# Patient Record
Sex: MALE | Race: WHITE | Employment: FULL TIME | URBAN - NONMETROPOLITAN AREA
[De-identification: names, ages, dates, MRNs, and addresses within clinical notes are randomized per-mention and may not be internally consistent; named-entity substitution may affect disease eponyms.]

---

## 2020-12-28 ENCOUNTER — APPOINTMENT (OUTPATIENT)
Dept: GENERAL RADIOLOGY | Age: 55
End: 2020-12-28
Attending: EMERGENCY MEDICINE
Payer: COMMERCIAL

## 2020-12-28 ENCOUNTER — HOSPITAL ENCOUNTER (EMERGENCY)
Age: 55
Discharge: HOME | End: 2020-12-28
Attending: EMERGENCY MEDICINE
Payer: COMMERCIAL

## 2020-12-28 VITALS
HEART RATE: 93 BPM | BODY MASS INDEX: 33.75 KG/M2 | SYSTOLIC BLOOD PRESSURE: 127 MMHG | OXYGEN SATURATION: 94 % | HEIGHT: 66 IN | TEMPERATURE: 98.2 F | RESPIRATION RATE: 20 BRPM | WEIGHT: 210 LBS | DIASTOLIC BLOOD PRESSURE: 88 MMHG

## 2020-12-28 DIAGNOSIS — R07.89 ATYPICAL CHEST PAIN: Primary | ICD-10-CM

## 2020-12-28 LAB
ALBUMIN SERPL-MCNC: 4.1 G/DL (ref 3.5–5)
ALBUMIN/GLOB SERPL: 1.1 {RATIO} (ref 1.1–2.2)
ALP SERPL-CCNC: 107 U/L (ref 45–117)
ALT SERPL-CCNC: 54 U/L (ref 12–78)
ANION GAP SERPL CALC-SCNC: 13 MMOL/L (ref 5–15)
AST SERPL W P-5'-P-CCNC: 34 U/L (ref 15–37)
BASOPHILS # BLD: 0 K/UL (ref 0–0.2)
BASOPHILS NFR BLD: 0 % (ref 0–2.5)
BILIRUB SERPL-MCNC: 1.3 MG/DL (ref 0.2–1)
BUN SERPL-MCNC: 31 MG/DL (ref 6–20)
BUN/CREAT SERPL: 21 (ref 12–20)
CA-I BLD-MCNC: 8.5 MG/DL (ref 8.5–10.1)
CHLORIDE SERPL-SCNC: 98 MMOL/L (ref 97–108)
CO2 SERPL-SCNC: 26 MMOL/L (ref 21–32)
CREAT SERPL-MCNC: 1.47 MG/DL (ref 0.7–1.3)
EOSINOPHIL # BLD: 0.2 K/UL (ref 0–0.7)
EOSINOPHIL NFR BLD: 2 % (ref 0.9–2.9)
ERYTHROCYTE [DISTWIDTH] IN BLOOD BY AUTOMATED COUNT: 13.5 % (ref 11.5–14.5)
GLOBULIN SER CALC-MCNC: 3.6 G/DL (ref 2–4)
GLUCOSE SERPL-MCNC: 186 MG/DL (ref 65–100)
HCT VFR BLD AUTO: 40 % (ref 41–53)
HGB BLD-MCNC: 13.5 G/DL (ref 13.5–17.5)
LYMPHOCYTES # BLD: 2.4 K/UL (ref 1–4.8)
LYMPHOCYTES NFR BLD: 25 % (ref 20.5–51.1)
MCH RBC QN AUTO: 29.1 PG (ref 31–34)
MCHC RBC AUTO-ENTMCNC: 33.8 G/DL (ref 31–36)
MCV RBC AUTO: 86.2 FL (ref 80–100)
MONOCYTES # BLD: 0.7 K/UL (ref 0.2–2.4)
MONOCYTES NFR BLD: 7 % (ref 1.7–9.3)
NEUTS SEG # BLD: 6.4 K/UL (ref 1.8–7.7)
NEUTS SEG NFR BLD: 66 % (ref 42–75)
NRBC # BLD: 0.01 K/UL
NRBC BLD-RTO: 10 PER 100 WBC
PLATELET # BLD AUTO: 209 K/UL
PMV BLD AUTO: 8.3 FL (ref 6.5–11.5)
POTASSIUM SERPL-SCNC: 3.4 MMOL/L (ref 3.5–5.1)
PROT SERPL-MCNC: 7.7 G/DL (ref 6.4–8.2)
RBC # BLD AUTO: 4.64 M/UL (ref 4.5–5.9)
SODIUM SERPL-SCNC: 137 MMOL/L (ref 136–145)
TROPONIN I SERPL-MCNC: <0.05 NG/ML
TROPONIN I SERPL-MCNC: <0.05 NG/ML
WBC # BLD AUTO: 9.6 K/UL (ref 4.4–11.3)

## 2020-12-28 PROCEDURE — 80053 COMPREHEN METABOLIC PANEL: CPT

## 2020-12-28 PROCEDURE — 71045 X-RAY EXAM CHEST 1 VIEW: CPT

## 2020-12-28 PROCEDURE — 99285 EMERGENCY DEPT VISIT HI MDM: CPT

## 2020-12-28 PROCEDURE — 36415 COLL VENOUS BLD VENIPUNCTURE: CPT

## 2020-12-28 PROCEDURE — 74011250637 HC RX REV CODE- 250/637: Performed by: EMERGENCY MEDICINE

## 2020-12-28 PROCEDURE — 85025 COMPLETE CBC W/AUTO DIFF WBC: CPT

## 2020-12-28 PROCEDURE — 93005 ELECTROCARDIOGRAM TRACING: CPT

## 2020-12-28 PROCEDURE — 84484 ASSAY OF TROPONIN QUANT: CPT

## 2020-12-28 RX ORDER — METFORMIN HYDROCHLORIDE EXTENDED-RELEASE TABLETS 1000 MG/1
1000 TABLET, FILM COATED, EXTENDED RELEASE ORAL 2 TIMES DAILY
COMMUNITY

## 2020-12-28 RX ORDER — GUAIFENESIN 100 MG/5ML
162 LIQUID (ML) ORAL
Status: COMPLETED | OUTPATIENT
Start: 2020-12-28 | End: 2020-12-28

## 2020-12-28 RX ADMIN — ASPIRIN 162 MG: 81 TABLET, CHEWABLE ORAL at 18:32

## 2020-12-28 NOTE — ED TRIAGE NOTES
Pt reports on and off mid sternal chest pain x 1 week. C/O nausea that comes and goes and is not at the same time of the chest pain. Denies any complaints at this time. Skin is warm and dry. Denies shortness of breath.

## 2020-12-29 LAB
ATRIAL RATE: 107 BPM
CALCULATED P AXIS, ECG09: 28 DEGREES
CALCULATED R AXIS, ECG10: 12 DEGREES
CALCULATED T AXIS, ECG11: 12 DEGREES
DIAGNOSIS, 93000: NORMAL
P-R INTERVAL, ECG05: 145 MS
Q-T INTERVAL, ECG07: 341 MS
QRS DURATION, ECG06: 99 MS
QTC CALCULATION (BEZET), ECG08: 455 MS
VENTRICULAR RATE, ECG03: 107 BPM

## 2020-12-29 NOTE — ED PROVIDER NOTES
EMERGENCY DEPARTMENT HISTORY AND PHYSICAL EXAM      Date: 12/28/2020  Patient Name: Carrie Mckinney      History of Presenting Illness     Chief Complaint   Patient presents with    Chest Pain       History Provided By: Patient    HPI: Carrie Mckinney, 50 y.o. male with a past medical history significant hypertension, hyperlipidemia and Elevated cholesterol presents to the ED with cc of chest pain. Patient notes episodes of chest pain occurring 3-4 times daily lasting upwards of 30 minutes daily over the last week. No prior history of cardiac disease MI or angina. He denies dyspnea lower extremity edema or pain there is no history of thromboembolic disease. No family history of MI non-smoker. Evaluated in the PCPs office and referred to the ED for possible admission. There are no other complaints, changes, or physical findings at this time. PCP: Joe Neff MD    Current Outpatient Medications   Medication Sig Dispense Refill    metFORMIN ER 1,000 mg tr24 Take 1,000 mg by mouth two (2) times a day. Past History     Past Medical History:  Past Medical History:   Diagnosis Date    Arthritis     Diabetes ([address]     High cholesterol     Hypertension        Past Surgical History:  History reviewed. No pertinent surgical history. Family History:  History reviewed. No pertinent family history. Social History:  Social History     Tobacco Use    Smoking status: Never Smoker    Smokeless tobacco: Never Used   Substance Use Topics    Alcohol use: Never     Frequency: Never    Drug use: Never       Allergies:  No Known Allergies      Review of Systems   Review of all other systems is negative  Review of Systems    Physical Exam   Pleasant white male no acute distress currently without complaints  Physical Exam  Vitals signs and nursing note reviewed. Constitutional:       General: He is not in acute distress. Appearance: Normal appearance. He is well-developed.  He is not ill-appearing or toxic-appearing. HENT:      Head: Normocephalic and atraumatic. Nose: Nose normal.      Mouth/Throat:      Mouth: Mucous membranes are moist.   Eyes:      Extraocular Movements: Extraocular movements intact. Conjunctiva/sclera: Conjunctivae normal.      Pupils: Pupils are equal, round, and reactive to light. Neck:      Musculoskeletal: Normal range of motion and neck supple. No neck rigidity or muscular tenderness. Vascular: No carotid bruit. Cardiovascular:      Rate and Rhythm: Normal rate and regular rhythm. Pulses: Normal pulses. Carotid pulses are 2+ on the right side and 2+ on the left side. Radial pulses are 2+ on the right side and 2+ on the left side. Dorsalis pedis pulses are 2+ on the right side and 2+ on the left side. Posterior tibial pulses are 2+ on the right side and 2+ on the left side. Heart sounds: Normal heart sounds. No murmur. Pulmonary:      Effort: Pulmonary effort is normal. No respiratory distress. Breath sounds: Normal breath sounds. No wheezing, rhonchi or rales. Abdominal:      General: Abdomen is flat. There is no distension. Palpations: Abdomen is soft. There is no mass. Tenderness: There is no abdominal tenderness. There is no right CVA tenderness, left CVA tenderness, guarding or rebound. Hernia: No hernia is present. Musculoskeletal: Normal range of motion. Skin:     General: Skin is warm and dry. Neurological:      General: No focal deficit present. Mental Status: He is alert and oriented to person, place, and time. Mental status is at baseline. Psychiatric:         Mood and Affect: Mood normal.         Behavior: Behavior normal.         Thought Content: Thought content normal.         Lab and Diagnostic Study Results     Labs -   No results found for this or any previous visit (from the past 12 hour(s)).     Radiologic Studies -   [unfilled]  CT Results  (Last 48 hours)    None CXR Results  (Last 48 hours)               12/28/20 1800  XR CHEST PORT Final result    Impression:  Impression:       No acute cardiopulmonary process. Narrative:      Findings:       A single portable AP view of the chest was obtained 17:58 hours without   comparison available at this institution. Cardiac caliber appears upper normal. Lungs adequately ventilated, with no   edema, effusion or consolidation. Medical Decision Making and ED Course   - I am the first and primary provider for this patient AND AM THE PRIMARY PROVIDER OF RECORD. - I reviewed the vital signs, available nursing notes, past medical history, past surgical history, family history and social history. - Initial assessment performed. The patients presenting problems have been discussed, and the staff are in agreement with the care plan formulated and outlined with them. I have encouraged them to ask questions as they arise throughout their visit. Vital Signs-Reviewed the patient's vital signs. Patient Vitals for the past 12 hrs:   Temp Pulse Resp BP SpO2   12/28/20 1730 98.2 °F (36.8 °C) (!) 105 16 (!) 148/93 97 %       EKG interpretation: (Preliminary): Performed at 1724, and read at 1735  Rhythm: normal sinus rhythm; and regular . Rate (approx.): 107  ; Axis: normal; NJ interval: prolonged; QRS interval: prolonged; ST/T wave: normal; Other findings: Sinus tachycardia without acute changes. Records Reviewed: Nursing Notes and Old Medical Records    The patient presents with chest pain with a differential diagnosis of  ACS, arrhythmia, acute MI, pulmonary edema/CHF, angina, aortic dissection, chest wall pain, costochondritis, dyspnea and pnuemonia    ED Course: 53-year-old male with multiple risk factors gives a good history for new onset angina occurring several times a day upwards of 30 minutes. No clear precipitation by exercise or stress.   Plan for overnight observation stress test in the morning awaiting labs. Provider Notes (Medical Decision Making):   New onset angina troponin is negative and EKG shows no acute changes strongly encouraged the patient to be admitted for observation cardiology evaluation in the morning he desires discharge will have the oncoming physician check a second troponin and order an outpatient stress test.  At change of shift patient left in the care of Dr. Scott Meraz. MDM           Consultations:       Consultations:         Procedures and Critical Care       Performed by: Clayborn Cockayne, MD  PROCEDURES: None  Procedures                     Disposition     Disposition: Condition unchanged and stable        Remove if not discharged  DISCHARGE PLAN:  1. Current Discharge Medication List      CONTINUE these medications which have NOT CHANGED    Details   metFORMIN ER 1,000 mg tr24 Take 1,000 mg by mouth two (2) times a day. 2.   Follow-up Information    None       3. Return to ED if worse   4. Current Discharge Medication List          Diagnosis     Clinical Impression: Chest pain suspect new onset angina  Attestations:    Clayborn Cockayne, MD    Please note that this dictation was completed with [employer], the computer voice recognition software. Quite often unanticipated grammatical, syntax, homophones, and other interpretive errors are inadvertently transcribed by the computer software. Please disregard these errors. Please excuse any errors that have escaped final proofreading. Thank you.

## 2021-11-12 ENCOUNTER — TELEPHONE (OUTPATIENT)
Dept: GASTROENTEROLOGY | Age: 56
End: 2021-11-12

## 2021-11-12 DIAGNOSIS — Z12.11 ENCOUNTER FOR SCREENING FOR MALIGNANT NEOPLASM OF COLON: Primary | ICD-10-CM

## 2021-11-14 RX ORDER — POLYETHYLENE GLYCOL 3350 17 G/17G
POWDER, FOR SOLUTION ORAL
Qty: 510 G | Refills: 0 | Status: SHIPPED | OUTPATIENT
Start: 2021-11-14 | End: 2021-12-10

## 2021-12-06 ENCOUNTER — HOSPITAL ENCOUNTER (OUTPATIENT)
Dept: PREADMISSION TESTING | Age: 56
Discharge: HOME | End: 2021-12-06
Payer: COMMERCIAL

## 2021-12-06 LAB — SARS-COV-2, COV2: NORMAL

## 2021-12-06 PROCEDURE — U0005 INFEC AGEN DETEC AMPLI PROBE: HCPCS

## 2021-12-07 LAB
SARS-COV-2, XPLCVT: NOT DETECTED
SOURCE, COVRS: NORMAL

## 2021-12-10 ENCOUNTER — HOSPITAL ENCOUNTER (OUTPATIENT)
Age: 56
Setting detail: OUTPATIENT SURGERY
Discharge: HOME | End: 2021-12-10
Attending: INTERNAL MEDICINE | Admitting: INTERNAL MEDICINE
Payer: COMMERCIAL

## 2021-12-10 ENCOUNTER — ANESTHESIA EVENT (OUTPATIENT)
Dept: ENDOSCOPY | Age: 56
End: 2021-12-10
Payer: COMMERCIAL

## 2021-12-10 ENCOUNTER — ANESTHESIA (OUTPATIENT)
Dept: ENDOSCOPY | Age: 56
End: 2021-12-10
Payer: COMMERCIAL

## 2021-12-10 VITALS
TEMPERATURE: 97 F | BODY MASS INDEX: 31.39 KG/M2 | WEIGHT: 200 LBS | DIASTOLIC BLOOD PRESSURE: 74 MMHG | SYSTOLIC BLOOD PRESSURE: 117 MMHG | HEIGHT: 67 IN | HEART RATE: 84 BPM | RESPIRATION RATE: 18 BRPM | OXYGEN SATURATION: 94 %

## 2021-12-10 LAB
GLUCOSE BLD STRIP.AUTO-MCNC: 150 MG/DL (ref 65–117)
PERFORMED BY, TECHID: ABNORMAL

## 2021-12-10 PROCEDURE — 82962 GLUCOSE BLOOD TEST: CPT

## 2021-12-10 PROCEDURE — 2709999900 HC NON-CHARGEABLE SUPPLY: Performed by: INTERNAL MEDICINE

## 2021-12-10 PROCEDURE — 45378 DIAGNOSTIC COLONOSCOPY: CPT | Performed by: INTERNAL MEDICINE

## 2021-12-10 PROCEDURE — 74011000258 HC RX REV CODE- 258: Performed by: NURSE ANESTHETIST, CERTIFIED REGISTERED

## 2021-12-10 PROCEDURE — 74011250636 HC RX REV CODE- 250/636: Performed by: NURSE ANESTHETIST, CERTIFIED REGISTERED

## 2021-12-10 PROCEDURE — 74011250636 HC RX REV CODE- 250/636

## 2021-12-10 PROCEDURE — 76040000019: Performed by: INTERNAL MEDICINE

## 2021-12-10 PROCEDURE — 76060000031 HC ANESTHESIA FIRST 0.5 HR: Performed by: INTERNAL MEDICINE

## 2021-12-10 PROCEDURE — 74011250636 HC RX REV CODE- 250/636: Performed by: INTERNAL MEDICINE

## 2021-12-10 RX ORDER — PROPOFOL 10 MG/ML
INJECTION, EMULSION INTRAVENOUS AS NEEDED
Status: DISCONTINUED | OUTPATIENT
Start: 2021-12-10 | End: 2021-12-10 | Stop reason: HOSPADM

## 2021-12-10 RX ORDER — SODIUM CHLORIDE 0.9 % (FLUSH) 0.9 %
5-40 SYRINGE (ML) INJECTION AS NEEDED
Status: DISCONTINUED | OUTPATIENT
Start: 2021-12-10 | End: 2021-12-10 | Stop reason: HOSPADM

## 2021-12-10 RX ORDER — ONDANSETRON 2 MG/ML
INJECTION INTRAMUSCULAR; INTRAVENOUS AS NEEDED
Status: DISCONTINUED | OUTPATIENT
Start: 2021-12-10 | End: 2021-12-10 | Stop reason: HOSPADM

## 2021-12-10 RX ORDER — SODIUM CHLORIDE 9 MG/ML
INJECTION, SOLUTION INTRAVENOUS
Status: DISCONTINUED | OUTPATIENT
Start: 2021-12-10 | End: 2021-12-10 | Stop reason: HOSPADM

## 2021-12-10 RX ORDER — SODIUM CHLORIDE 0.9 % (FLUSH) 0.9 %
5-40 SYRINGE (ML) INJECTION EVERY 8 HOURS
Status: DISCONTINUED | OUTPATIENT
Start: 2021-12-10 | End: 2021-12-10 | Stop reason: HOSPADM

## 2021-12-10 RX ORDER — SODIUM CHLORIDE 9 MG/ML
25 INJECTION, SOLUTION INTRAVENOUS CONTINUOUS
Status: DISCONTINUED | OUTPATIENT
Start: 2021-12-10 | End: 2021-12-10 | Stop reason: HOSPADM

## 2021-12-10 RX ORDER — SODIUM CHLORIDE 9 MG/ML
125 INJECTION, SOLUTION INTRAVENOUS CONTINUOUS
Status: DISCONTINUED | OUTPATIENT
Start: 2021-12-10 | End: 2021-12-10 | Stop reason: HOSPADM

## 2021-12-10 RX ADMIN — PROPOFOL 100 MG: 10 INJECTION, EMULSION INTRAVENOUS at 11:00

## 2021-12-10 RX ADMIN — SODIUM CHLORIDE: 9 INJECTION INTRAVENOUS at 10:54

## 2021-12-10 RX ADMIN — ONDANSETRON 4 MG: 2 INJECTION INTRAMUSCULAR; INTRAVENOUS at 10:54

## 2021-12-10 RX ADMIN — SODIUM CHLORIDE 25 ML/HR: 9 INJECTION, SOLUTION INTRAVENOUS at 09:30

## 2021-12-10 RX ADMIN — PROPOFOL 50 MG: 10 INJECTION, EMULSION INTRAVENOUS at 11:09

## 2021-12-10 RX ADMIN — PROPOFOL 100 MG: 10 INJECTION, EMULSION INTRAVENOUS at 11:03

## 2021-12-10 NOTE — DISCHARGE INSTRUCTIONS

## 2021-12-10 NOTE — H&P
History and Physical    [facility]        1965  [id number]        [id number]     Pre-Procedure Diagnosis:  COLON SCREENING    Chief Complaint:  No chief complaint on file. HPI: 53-year-old male with history of hypertension, hyperlipidemia, diabetes mellitus type 2, and arthritis who comes in for a screening colonoscopy. Patient has no new complaints today. Past Medical History:   Diagnosis Date    Arthritis     Diabetes ([address]     High cholesterol     Hypertension     Nausea & vomiting      History reviewed. No pertinent surgical history. Family History   Problem Relation Age of Onset    No Known Problems Mother     Hypertension Father     Kidney Disease Father      Social History     Socioeconomic History    Marital status:    Tobacco Use    Smoking status: Never Smoker    Smokeless tobacco: Never Used   Vaping Use    Vaping Use: Never used   Substance and Sexual Activity    Alcohol use: Never    Drug use: Never       Allergies:  No Known Allergies  Medications:   Current Facility-Administered Medications   Medication Dose Route Frequency    0.9% sodium chloride infusion  25 mL/hr IntraVENous CONTINUOUS     Vital Signs   Visit Vitals  /84   Pulse 92   Temp 98.3 °F (36.8 °C)   Resp 18   Ht 5' 7\" (1.702 m)   Wt 90.7 kg (200 lb)   SpO2 94%   BMI 31.32 kg/m²       Review of Systems  Review of systems as noted in HPI. Physical Exam:  General:  Alert, cooperative, no distress, appears stated age. Eyes:  No icterus   Neck: Supple, no adenopathy and no JVD. Lungs:   Clear to auscultation bilaterally. Heart:  Regular rate and rhythm, S1, S2 normal, no murmur, click, rub or gallop. Abdomen:   Soft, non-tender. Bowel sounds normal. No masses,  No organomegaly. Neurologic: Grossly intact. Laboratory Data:  No results found for this or any previous visit (from the past 24 hour(s)).         Impression and Plan:  Colon screening  -Colonoscopy      [place] Funes MD  12/10/2021  9:28 AM

## 2021-12-10 NOTE — ANESTHESIA PREPROCEDURE EVALUATION
Relevant Problems   No relevant active problems       Anesthetic History   No history of anesthetic complications  PONV and other anesthesia complications          Review of Systems / Medical History  Patient summary reviewed, nursing notes reviewed and pertinent labs reviewed    Pulmonary  Within defined limits                 Neuro/Psych   Within defined limits           Cardiovascular  Within defined limits  Hypertension                   GI/Hepatic/Renal  Within defined limits              Endo/Other  Within defined limits  Diabetes    Arthritis     Other Findings              Physical Exam    Airway  Mallampati: II  TM Distance: 4 - 6 cm  Neck ROM: normal range of motion        Cardiovascular    Rhythm: regular  Rate: normal         Dental  No notable dental hx       Pulmonary  Breath sounds clear to auscultation               Abdominal  Abdominal exam normal       Other Findings            Anesthetic Plan    ASA: 2  Anesthesia type: total IV anesthesia            Anesthetic plan and risks discussed with: Patient

## 2021-12-10 NOTE — INTERVAL H&P NOTE
Update History & Physical    The Patient's History and Physical of December 10, 2021, was reviewed with the patient and I examined the patient. There was no change. The surgical site was confirmed by the patient and me. Plan:  The risk, benefits, expected outcome, and alternative to the recommended procedure have been discussed with the patient. Patient understands and wants to proceed with the procedure.     Electronically signed by Corine Duron MD on 12/10/2021 at 9:30 AM

## 2021-12-10 NOTE — OP NOTES
Colonoscopy Procedure Note      Patient: Radha Patricia MRN: [id number]  SSN: [id number]    YOB: 1965  Age: 49 y.o. Sex: male      Date of Procedure: 12/10/2021  Date/Time:  12/10/2021 11:18 AM       IMPRESSION:     1. Generalized diverticulosis       RECOMMENDATIONS:     1) Increase fiber in diet to keep stools soft. 2) Repeat colonoscopy in 10 years. INDICATION: Colon screening     PROCEDURE PERFORMED: Colonoscopy     DESCRIPTION OF PROCEDURE: An informed consent was obtained. The patient was placed in left lateral position. Perianal inspection and a digital rectal exam was performed. Video colonoscope was introduced into the rectum and advanced under direct vision up to the terminal ileum. With adequate insufflation and maneuvering of the withdrawing scope, the colonic mucosa was visualized carefully. Retroflexion was performed in the rectum to see the anorectum and also in the ascending colon to look behind the folds. Vital signs, pulse oximetry, single lead cardiac monitor were monitored throughout the procedure as the sedation was titrated to the desired effect ensuring patient comfort and safety. The patient tolerated the procedure very well and was transferred to the recovery area. Following is the summary of findings: Diverticulosis was noted throughout the entire colon moderate number. No other mucosal lesions were noted. ENDOSCOPIST: Josias Scott MD      ENDOSCOPE: Olympus videocolonoscope     ASSISTANT:Circ-1: Amie Vazquez RN              Scrub Tech-1: Mary Rivas     ANESTHESIA: TIVA      QUALITY OF PREPARATION: Good      FINDINGS:   1.  Generalized diverticulosis       Complications: None     EBL: None     SPECIMENS: * No specimens in log *          Josias Scott MD  December 10, 2021  11:18 AM

## 2021-12-10 NOTE — ANESTHESIA POSTPROCEDURE EVALUATION
Procedure(s):  COLONOSCOPY (ANES TIVA).     total IV anesthesia    Anesthesia Post Evaluation      Multimodal analgesia: multimodal analgesia not used between 6 hours prior to anesthesia start to PACU discharge  Patient location during evaluation: PACU  Patient participation: complete - patient participated  Level of consciousness: sleepy but conscious  Pain management: adequate  Anesthetic complications: no  Cardiovascular status: acceptable and stable  Respiratory status: acceptable  Hydration status: acceptable  Post anesthesia nausea and vomiting:  none  Final Post Anesthesia Temperature Assessment:  Normothermia (36.0-37.5 degrees C)      INITIAL Post-op Vital signs:   Vitals Value Taken Time   /71 12/10/21 1116   Temp 36.1 °C (97 °F) 12/10/21 1116   Pulse 90 12/10/21 1116   Resp 16 12/10/21 1116   SpO2 94 % 12/10/21 1116

## 2022-03-28 ENCOUNTER — OFFICE VISIT (OUTPATIENT)
Dept: ENDOCRINOLOGY | Age: 57
End: 2022-03-28
Payer: COMMERCIAL

## 2022-03-28 VITALS
RESPIRATION RATE: 18 BRPM | HEART RATE: 98 BPM | OXYGEN SATURATION: 98 % | BODY MASS INDEX: 32.22 KG/M2 | SYSTOLIC BLOOD PRESSURE: 117 MMHG | TEMPERATURE: 98.1 F | DIASTOLIC BLOOD PRESSURE: 80 MMHG | WEIGHT: 200.5 LBS | HEIGHT: 66 IN

## 2022-03-28 DIAGNOSIS — E11.00 TYPE 2 DIABETES MELLITUS WITH HYPEROSMOLARITY WITHOUT COMA, WITHOUT LONG-TERM CURRENT USE OF INSULIN (HCC): Primary | ICD-10-CM

## 2022-03-28 DIAGNOSIS — E78.2 MIXED HYPERLIPIDEMIA: ICD-10-CM

## 2022-03-28 DIAGNOSIS — I10 BENIGN ESSENTIAL HTN: ICD-10-CM

## 2022-03-28 LAB
GLUCOSE POC: 119 MG/DL
HBA1C MFR BLD HPLC: 7.9 %

## 2022-03-28 PROCEDURE — 99204 OFFICE O/P NEW MOD 45 MIN: CPT | Performed by: INTERNAL MEDICINE

## 2022-03-28 PROCEDURE — 83036 HEMOGLOBIN GLYCOSYLATED A1C: CPT | Performed by: INTERNAL MEDICINE

## 2022-03-28 PROCEDURE — 3051F HG A1C>EQUAL 7.0%<8.0%: CPT | Performed by: INTERNAL MEDICINE

## 2022-03-28 PROCEDURE — 82962 GLUCOSE BLOOD TEST: CPT | Performed by: INTERNAL MEDICINE

## 2022-03-28 RX ORDER — METFORMIN HYDROCHLORIDE 1000 MG/1
1000 TABLET ORAL 2 TIMES DAILY WITH MEALS
Qty: 180 TABLET | Refills: 1 | Status: SHIPPED | OUTPATIENT
Start: 2022-03-28 | End: 2022-07-06 | Stop reason: SDUPTHER

## 2022-03-28 RX ORDER — AMLODIPINE AND BENAZEPRIL HYDROCHLORIDE 10; 20 MG/1; MG/1
1 CAPSULE ORAL DAILY
COMMUNITY

## 2022-03-28 RX ORDER — BLOOD SUGAR DIAGNOSTIC
STRIP MISCELLANEOUS
Qty: 50 STRIP | Refills: 5 | Status: SHIPPED | OUTPATIENT
Start: 2022-03-28

## 2022-03-28 RX ORDER — GLIPIZIDE 5 MG/1
5 TABLET, FILM COATED, EXTENDED RELEASE ORAL DAILY
COMMUNITY
End: 2022-07-06

## 2022-03-28 RX ORDER — LANCETS
EACH MISCELLANEOUS
Qty: 50 EACH | Refills: 5 | Status: SHIPPED | OUTPATIENT
Start: 2022-03-28

## 2022-03-28 RX ORDER — INSULIN PUMP SYRINGE, 3 ML
EACH MISCELLANEOUS
Qty: 1 KIT | Refills: 0 | Status: SHIPPED | OUTPATIENT
Start: 2022-03-28

## 2022-03-28 RX ORDER — ATORVASTATIN CALCIUM 80 MG/1
80 TABLET, FILM COATED ORAL DAILY
COMMUNITY

## 2022-03-28 RX ORDER — CHLORTHALIDONE 25 MG/1
25 TABLET ORAL DAILY
COMMUNITY
End: 2022-07-06

## 2022-03-28 NOTE — PROGRESS NOTES
1. \"Have you been to the ER, urgent care clinic since your last visit? Hospitalized since your last visit? \" No    2. \"Have you seen or consulted any other health care providers outside of the [address] [place] [address] since your last visit? \" No     3. For patients aged 39-70: Has the patient had a colonoscopy / FIT/ Cologuard? Yes - no Care Gap present      If the patient is female:    4. For patients aged 41-77: Has the patient had a mammogram within the past 2 years? NA - based on age or sex      11. For patients aged 21-65: Has the patient had a pap smear?  NA - based on age or sex

## 2022-03-28 NOTE — PROGRESS NOTES
History and Physical    Patient: Radha Patricia MRN: [id number]  SSN: [id number]    YOB: 1965  Age: 49 y.o. Sex: male      Subjective:      Radha Patricia is a 49 y.o. male with past medical history of hypertension, hyperlipidemia is sent to me by primary care physician Dr. Aurora Ortega for type 2 diabetes mellitus. He was diagnosed with diabetes around 5 years back. Currently taking Metformin 1000 mg twice a day, Farxiga 10 mg daily was started a couple months back, also taking glipizide 5 mg daily. He tells me that he was able to get off of all diabetes medications few years back because he was doing so well. Generally skips breakfast, eats a sandwich, sugar-free Jell-O for lunch, dinner is generally chicken or fish with vegetables, he is struggling with appetite, he feels hungry all the time, complaining of muscle spasms in his legs in the middle of the night. He drinks 2 dry drinks per day and tries to drink a lot of water. Does not have a glucometer, not checking blood glucose at home.   Overdue for diabetic eye exam.    Glucometer reading: Not checking blood glucose at home    · Diagnosis: 5-6 years  · Current treatment: metformin 1000 mg bid, Farxiga 10 mg daily, glipizide 5 mg daily  · Past treatment: none  · Glucose checks: not checking   · Hyperglycemia:   · Hypoglycemia:   · Meals per day: 2, breakfast: skips, lunch: turkey sandwich on rye bread, snaps, jello, dinner: fish/ chicken, vegetables snacks: apples, grapes, blueberries, diet drinks 2 a day  · Exercise: walks   · DM related hospitalizations: no    Smoking: no  Family history of coronary artery disease/stroke: father had CAD, mother stroke     Complications of DM:  · CAD: no  · CVA: no  · PVD: no  · Amputations: no   · Retinopathy: no; last exam was 2020  · Gastropathy: no  · Nephropathy: no  · Neuropathy: no   Sees podiatrist: no    Medications:  · Statin: atorvastatin 80 mg  · ACE-I: benazepril   · ASA: yes    · Diabetes education: no    Past Medical History:   Diagnosis Date    Arthritis     Diabetes ([address]     High cholesterol     Hypertension     Nausea & vomiting      Past Surgical History:   Procedure Laterality Date    COLONOSCOPY N/A 12/10/2021    COLONOSCOPY ([facility]AMPBELL ROSARIO) performed by Kolton Qureshi MD at [facility] ENDOSCOPY      Family History   Problem Relation Age of Onset    No Known Problems Mother     Hypertension Father     Kidney Disease Father      Social History     Tobacco Use    Smoking status: Never Smoker    Smokeless tobacco: Never Used   Substance Use Topics    Alcohol use: Never      Prior to Admission medications    Medication Sig Start Date End Date Taking? Authorizing Provider   dapagliflozin Nida Sidle) 10 mg tab tablet Take 10 mg by mouth daily. Yes Provider, Historical   chlorthalidone (HYGROTON) 25 mg tablet Take 25 mg by mouth daily. Yes Provider, Historical   glipiZIDE SR (GLUCOTROL XL) 5 mg CR tablet Take 5 mg by mouth daily. Yes Provider, Historical   amLODIPine-benazepril (LOTREL) 10-20 mg per capsule Take 1 Capsule by mouth daily. Yes Provider, Historical   atorvastatin (LIPITOR) 80 mg tablet Take 80 mg by mouth daily. Yes Provider, Historical   metFORMIN (GLUCOPHAGE) 1,000 mg tablet Take 1 Tablet by mouth two (2) times daily (with meals) for 90 days. 3/28/22 6/26/22 Yes Paty Farooq MD   dapagliflozin Nida Sidle) 10 mg tab tablet Take 1 Tablet by mouth daily for 90 days. 3/28/22 6/26/22 Yes Paty Farooq MD   semaglutide (Rybelsus) 3 mg tablet Take 1 Tablet by mouth Daily (before breakfast) for 30 days. 3/28/22 4/27/22 Yes Paty Farooq MD   semaglutide (Rybelsus) 7 mg tablet Take 1 Tablet by mouth Daily (before breakfast) for 30 days.  Start after 30 days of Rebelsus 3 mg 3/28/22 4/27/22 Yes Paty Farooq MD   Blood-Glucose Meter (OneTouch Ultra2 Meter) monitoring kit Check glucose once a day 3/28/22  Yes Paty Farooq MD   lancets (OneTouch UltraSoft Lancets) misc Check glucose once a day 3/28/22  Yes Reggie Jones MD   glucose blood VI test strips (OneTouch Ultra Test) strip Check glucose once a day 3/28/22  Yes Reggie Jones MD   metFORMIN ER 1,000 mg tr24 Take 1,000 mg by mouth two (2) times a day. Yes Other, MD Princess        No Known Allergies    Review of Systems:  ROS    A comprehensive review of systems was preformed and it is negative except mentioned in HPI    Objective:     Vitals:    03/28/22 1239   BP: 117/80   Pulse: 98   Resp: 18   Temp: 98.1 °F (36.7 °C)   TempSrc: Oral   SpO2: 98%   Weight: 200 lb 8 oz (90.9 kg)   Height: 5' 6\" (1.676 m)        Physical Exam:    Physical Exam  Vitals and nursing note reviewed. Constitutional:       Appearance: Normal appearance. HENT:      Head: Normocephalic and atraumatic. Eyes:      Extraocular Movements: Extraocular movements intact. Cardiovascular:      Rate and Rhythm: Normal rate and regular rhythm. Pulmonary:      Effort: Pulmonary effort is normal.      Breath sounds: Normal breath sounds. Musculoskeletal:         General: No swelling. Normal range of motion. Cervical back: Neck supple. Skin:     General: Skin is warm. Neurological:      General: No focal deficit present. Mental Status: He is alert and oriented to person, place, and time. Psychiatric:         Mood and Affect: Mood normal.         Behavior: Behavior normal.       diabetic foot exam:  Bilateral diabetic foot exam was performed today. Dorsalis pedis pulses 2+ bilaterally. Monofilament sensation normal bilaterally. No ulcers or skin breakdown.      Labs and Imaging:    Last 3 Recorded Weights in this Encounter    03/28/22 1239   Weight: 200 lb 8 oz (90.9 kg)        No results found for: HBA1C, [id number], MUN8MSIH, BTX1RANF     Assessment:     Patient Active Problem List   Diagnosis Code    Type 2 diabetes mellitus with hyperosmolarity without coma, without long-term current use of insulin ([facility]) E11.00    Benign essential HTN I10  Mixed hyperlipidemia E78.2           Plan:     Type 2 diabetes mellitus, uncontrolled:  I reviewed progress note and labs from the referring provider's office. Hemoglobin A1c was 11.9% on 2-, 8.3% on 1-6-2022, 7.9% today. Fingerstick blood glucose is 119 mg/dL in my office today. Up to date with diabetes related annual labs: January 2022 except TSH  Up to date with diabetic eye exam: No  Plan:  Discussed with patient importance of controlling diabetes to prevent endorgan damage. He is at high risk of coronary artery disease because of family history. Discussed with patient about eating a healthy breakfast every morning, sometimes that helps with regulating appetite throughout the day. Start Rybelsus 3 mg daily for 1 month and then increase to 7 mg daily. Discussed common side effects of Rybelsus and the correct way of taking it. Stop glipizide when he increases Rybelsus to 7 mg. Continue Metformin 1000 mg twice a day and Farxiga 10 mg daily. Advised patient to drink a lot of water. Sending new glucometer, check blood glucose once a day every day. Referring patient to diabetes education. I will see him back in 3 months. Essential hypertension:  Blood pressure well controlled on current medications. No microalbuminuria, last checked in January 2022. Mixed hyperlipidemia:  1-6-2022: Total cholesterol 158, triglycerides 433, LDL 38. Check TSH. Continue atorvastatin 80 mg. Patient does not drink alcohol. Advised patient to cut back on fried, fatty foods.     Orders Placed This Encounter    TSH AND FREE T4 ([facility] Default)    REFERRAL TO DIABETIC EDUCATION     Referral Priority:   Routine     Referral Type:   Consultation     Referral Reason:   Specialty Services Required     Number of Visits Requested:   1    AMB POC GLUCOSE BLOOD, BY GLUCOSE MONITORING DEVICE    AMB POC HEMOGLOBIN A1C    DISCONTD: semaglutide (Rybelsus) 3 mg tablet     Sig: Take 1 Tablet by mouth Daily (before breakfast) for 30 days. Dispense:  30 Tablet     Refill:  0    DISCONTD: semaglutide (Rybelsus) 7 mg tablet     Sig: Take 1 Tablet by mouth Daily (before breakfast) for 30 days. Start after 30 days of Rebelsus 3 mg     Dispense:  30 Tablet     Refill:  3    metFORMIN (GLUCOPHAGE) 1,000 mg tablet     Sig: Take 1 Tablet by mouth two (2) times daily (with meals) for 90 days. Dispense:  180 Tablet     Refill:  1    dapagliflozin (Farxiga) 10 mg tab tablet     Sig: Take 1 Tablet by mouth daily for 90 days. Dispense:  90 Tablet     Refill:  1    semaglutide (Rybelsus) 3 mg tablet     Sig: Take 1 Tablet by mouth Daily (before breakfast) for 30 days. Dispense:  30 Tablet     Refill:  0    semaglutide (Rybelsus) 7 mg tablet     Sig: Take 1 Tablet by mouth Daily (before breakfast) for 30 days.  Start after 30 days of Rebelsus 3 mg     Dispense:  30 Tablet     Refill:  3    Blood-Glucose Meter (OneTouch Ultra2 Meter) monitoring kit     Sig: Check glucose once a day     Dispense:  1 Kit     Refill:  0    lancets (OneTouch UltraSoft Lancets) misc     Sig: Check glucose once a day     Dispense:  50 Each     Refill:  5    glucose blood VI test strips (OneTouch Ultra Test) strip     Sig: Check glucose once a day     Dispense:  50 Strip     Refill:  5        Signed By: Karime Brandt MD     March 28, 2022      Return to clinic 3 months

## 2022-03-28 NOTE — PATIENT INSTRUCTIONS
Start Rybelsus 3 mg for 1 month and then increase to 7 mg daily and stop glipizide     Take Rybelsus first thing in the morning on empty stomach with a sip of water and wait 30 mins before you eat or drink anything else    Continue metformin 1000 mg twice a day and farxiga 10 mg daily. Check glucose once a day and bring meter to next visit.

## 2022-03-28 NOTE — LETTER
3/28/2022    Patient: Valeri Dang   YOB: 1965   Date of Visit: 3/28/2022     Serene Gasca MD  [facility] [address] [address]  Via Fax: [phone]    Dear Serene Gasca MD,      Thank you for referring Mr. Valeri Dang to [address] [place] [address] for evaluation. My notes for this consultation are attached. If you have questions, please do not hesitate to call me. I look forward to following your patient along with you.       Sincerely,    Nataliia Cali MD

## 2022-03-29 LAB
T4 FREE SERPL-MCNC: 1.36 NG/DL (ref 0.82–1.77)
TSH SERPL DL<=0.005 MIU/L-ACNC: 0.9 UIU/ML (ref 0.45–4.5)

## 2022-07-06 ENCOUNTER — OFFICE VISIT (OUTPATIENT)
Dept: ENDOCRINOLOGY | Age: 57
End: 2022-07-06
Payer: COMMERCIAL

## 2022-07-06 VITALS
BODY MASS INDEX: 32.3 KG/M2 | DIASTOLIC BLOOD PRESSURE: 76 MMHG | HEIGHT: 66 IN | HEART RATE: 87 BPM | OXYGEN SATURATION: 92 % | TEMPERATURE: 97.8 F | WEIGHT: 201 LBS | SYSTOLIC BLOOD PRESSURE: 121 MMHG

## 2022-07-06 DIAGNOSIS — I10 BENIGN ESSENTIAL HTN: ICD-10-CM

## 2022-07-06 DIAGNOSIS — E78.2 MIXED HYPERLIPIDEMIA: ICD-10-CM

## 2022-07-06 DIAGNOSIS — E11.00 TYPE 2 DIABETES MELLITUS WITH HYPEROSMOLARITY WITHOUT COMA, WITHOUT LONG-TERM CURRENT USE OF INSULIN (HCC): Primary | ICD-10-CM

## 2022-07-06 LAB
GLUCOSE POC: 102 MG/DL
HBA1C MFR BLD HPLC: 8.1 %

## 2022-07-06 PROCEDURE — 82962 GLUCOSE BLOOD TEST: CPT | Performed by: INTERNAL MEDICINE

## 2022-07-06 PROCEDURE — 99214 OFFICE O/P EST MOD 30 MIN: CPT | Performed by: INTERNAL MEDICINE

## 2022-07-06 PROCEDURE — 83036 HEMOGLOBIN GLYCOSYLATED A1C: CPT | Performed by: INTERNAL MEDICINE

## 2022-07-06 PROCEDURE — 3052F HG A1C>EQUAL 8.0%<EQUAL 9.0%: CPT | Performed by: INTERNAL MEDICINE

## 2022-07-06 RX ORDER — METFORMIN HYDROCHLORIDE 1000 MG/1
1000 TABLET ORAL 2 TIMES DAILY WITH MEALS
Qty: 180 TABLET | Refills: 3 | Status: SHIPPED | OUTPATIENT
Start: 2022-07-06 | End: 2022-10-04

## 2022-07-06 NOTE — PROGRESS NOTES
History and Physical    Patient: Natali Calderón MRN: [id number]  SSN: [id number]    YOB: 1965  Age: 49 y.o. Sex: male      Subjective:      Natali Calderón is a 49 y.o. male with past medical history of hypertension, hyperlipidemia is here for follow-up of type 2 diabetes mellitus. He was sent to me by primary care physician Dr. Brisa Guzman. Patient is taking metformin 1000 mg twice a day, Farxiga 10 mg daily, glipizide was stopped and he is on Rybelsus 7 mg daily. He is taking Rybelsus regularly and appropriately. Denies any decrease in appetite since starting Rybelsus. Continues to be physically active. Gets 10,000-12,000 steps per day. However he has not been following diabetic diet. Unable to give me details. Continues to eat fast food, fatty food. Not been checking blood glucose since past 1 month. Overdue for diabetic eye exam.    Glucometer reading: Not checking blood glucose at home since past 1 month.   In May his glucose readings were ranging from 126 to 277 mg/dL    Updated diabetes history:  · Diagnosis: 5-6 years  · Current treatment: metformin 1000 mg bid, Farxiga 10 mg daily, Rybelsus 7 mg daily  · Past treatment:  Glipizide  · Glucose checks: not checking   · Hyperglycemia:   · Hypoglycemia:   · Meals per day: 2, breakfast: skips, lunch: turkey sandwich on rye bread, snaps, jello, dinner: fish/ chicken, vegetables snacks: apples, grapes, blueberries, diet drinks 2 a day  · Exercise: walks   · DM related hospitalizations: no    Smoking: no  Family history of coronary artery disease/stroke: father had CAD, mother stroke     Complications of DM:  · CAD: no  · CVA: no  · PVD: no  · Amputations: no   · Retinopathy: no; last exam was 2020  · Gastropathy: no  · Nephropathy: no  · Neuropathy: no   Sees podiatrist: no    Medications:  · Statin: atorvastatin 80 mg  · ACE-I: benazepril   · ASA: yes    · Diabetes education: no    Past Medical History:   Diagnosis Date    Arthritis     Diabetes ([facility] [address]     Gout     High cholesterol     Hypertension     Nausea & vomiting      Past Surgical History:   Procedure Laterality Date    COLONOSCOPY N/A 12/10/2021    COLONOSCOPY (CAMPBELL ROSARIO) performed by Rosario Jose MD at [facility] ENDOSCOPY      Family History   Problem Relation Age of Onset    No Known Problems Mother     Hypertension Father     Kidney Disease Father      Social History     Tobacco Use    Smoking status: Never Smoker    Smokeless tobacco: Never Used   Substance Use Topics    Alcohol use: Never      Prior to Admission medications    Medication Sig Start Date End Date Taking? Authorizing Provider   semaglutide (Rybelsus) 14 mg tablet Take 1 Tablet by mouth Daily (before breakfast) for 90 days. 7/6/22 10/4/22 Yes Brian Arce MD   dapagliflozin Samul Maya) 10 mg tab tablet Take 1 Tablet by mouth daily for 90 days. 7/6/22 10/4/22 Yes Brian Arce MD   metFORMIN (GLUCOPHAGE) 1,000 mg tablet Take 1 Tablet by mouth two (2) times daily (with meals) for 90 days. 7/6/22 10/4/22 Yes Brian Arce MD   dapagliflozin Samul Maya) 10 mg tab tablet Take 10 mg by mouth daily. Yes Provider, Historical   amLODIPine-benazepril (LOTREL) 10-20 mg per capsule Take 1 Capsule by mouth daily. Yes Provider, Historical   atorvastatin (LIPITOR) 80 mg tablet Take 80 mg by mouth daily. Yes Provider, Historical   Blood-Glucose Meter (OneTouch Ultra2 Meter) monitoring kit Check glucose once a day 3/28/22  Yes Brian Arce MD   lancets (OneTouch UltraSoft Lancets) misc Check glucose once a day 3/28/22  Yes Brian Arce MD   glucose blood VI test strips (OneTouch Ultra Test) strip Check glucose once a day 3/28/22  Yes Brian Arce MD   metFORMIN ER 1,000 mg tr24 Take 1,000 mg by mouth two (2) times a day.    Yes Other, MD Princess        No Known Allergies    Review of Systems:  ROS    A comprehensive review of systems was preformed and it is negative except mentioned in HPI    Objective: Vitals:    07/06/22 1244   BP: 121/76   Pulse: 87   Temp: 97.8 °F (36.6 °C)   TempSrc: Temporal   SpO2: 92%   Weight: 201 lb (91.2 kg)   Height: 5' 6\" (1.676 m)        Physical Exam:    Physical Exam  Vitals and nursing note reviewed. Constitutional:       Appearance: Normal appearance. HENT:      Head: Normocephalic and atraumatic. Cardiovascular:      Rate and Rhythm: Normal rate and regular rhythm. Pulmonary:      Effort: Pulmonary effort is normal.      Breath sounds: Normal breath sounds. Neurological:      Mental Status: He is alert. 3-:  diabetic foot exam:  Bilateral diabetic foot exam was performed today. Dorsalis pedis pulses 2+ bilaterally. Monofilament sensation normal bilaterally. No ulcers or skin breakdown. Labs and Imaging:    Last 3 Recorded Weights in this Encounter    07/06/22 1244   Weight: 201 lb (91.2 kg)        No results found for: HBA1C, KZF2ZOVP, HMV3ADMR, ZGR4NFRT     Assessment:     Patient Active Problem List   Diagnosis Code    Type 2 diabetes mellitus with hyperosmolarity without coma, without long-term current use of insulin ([facility] [address] E11.00    Benign essential HTN I10    Mixed hyperlipidemia E78.2           Plan:     Type 2 diabetes mellitus, uncontrolled:  Hemoglobin A1c was 11.9% on 2-, 8.3% on 1-6-2022, 7.9% on 3-, 8.1% today. Fingerstick blood glucose is 102 mg/dL in my office today. Up to date with diabetes related annual labs: January 2022 except TSH  Up to date with diabetic eye exam: No  Plan:  Discussed with patient importance of controlling diabetes to prevent endorgan damage. He is at high risk of coronary artery disease because of family history. Encourage patient to work on diabetic diet. Gave him phone number to call and make appointment for diabetes education as he missed their call. Increase Rybelsus to 14 mg daily. Continue metformin 1000 mg twice a day and Farxiga 10 mg daily.   Check blood glucose every other day and follow-up with PCP in 3 months. Essential hypertension:  Blood pressure well controlled on current medications. No microalbuminuria, last checked in January 2022. Mixed hyperlipidemia:  1-6-2022: Total cholesterol 158, triglycerides 433, LDL 38. Check TSH. Continue atorvastatin 80 mg. Patient does not drink alcohol. Advised patient to cut back on fried, fatty foods, continue to be physically active. Orders Placed This Encounter    AMB POC GLUCOSE BLOOD, BY GLUCOSE MONITORING DEVICE    AMB POC HEMOGLOBIN A1C    semaglutide (Rybelsus) 14 mg tablet     Sig: Take 1 Tablet by mouth Daily (before breakfast) for 90 days. Dispense:  90 Tablet     Refill:  3     DC rybelsus 7 mg    dapagliflozin (Farxiga) 10 mg tab tablet     Sig: Take 1 Tablet by mouth daily for 90 days. Dispense:  90 Tablet     Refill:  3    metFORMIN (GLUCOPHAGE) 1,000 mg tablet     Sig: Take 1 Tablet by mouth two (2) times daily (with meals) for 90 days.      Dispense:  180 Tablet     Refill:  3        Signed By: Dana Kline MD     July 6, 2022      Return to clinic

## 2022-07-06 NOTE — LETTER
7/6/2022    Patient: Celine Potter   YOB: 1965   Date of Visit: 7/6/2022     Kate Whitaker MD  [facility] [address] [address]  Via Fax: [phone]    Dear Kate Whitaker MD,      Thank you for referring Mr. Celine Potter to [place] [address] for evaluation. My notes for this consultation are attached. If you have questions, please do not hesitate to call me. I look forward to following your patient along with you.       Sincerely,    Clemente Alexandra MD

## 2023-01-13 ENCOUNTER — TRANSCRIBE ORDER (OUTPATIENT)
Dept: SCHEDULING | Age: 58
End: 2023-01-13

## 2023-01-13 DIAGNOSIS — R94.5 ABNORMAL RESULTS OF LIVER FUNCTION STUDIES: Primary | ICD-10-CM

## 2023-01-20 ENCOUNTER — HOSPITAL ENCOUNTER (OUTPATIENT)
Dept: ULTRASOUND IMAGING | Age: 58
Discharge: HOME | End: 2023-01-20
Attending: FAMILY MEDICINE
Payer: COMMERCIAL

## 2023-01-20 DIAGNOSIS — R94.5 ABNORMAL RESULTS OF LIVER FUNCTION STUDIES: ICD-10-CM

## 2023-01-20 PROCEDURE — 76705 ECHO EXAM OF ABDOMEN: CPT

## 2024-03-07 ENCOUNTER — TELEPHONE (OUTPATIENT)
Age: 59
End: 2024-03-07

## 2024-03-07 NOTE — TELEPHONE ENCOUNTER
Left vm for patient to call back so that I can give him info to have referral sent to us otherwise we will need to cancel appt with Dr. Denney in [place].

## 2024-03-11 ENCOUNTER — OFFICE VISIT (OUTPATIENT)
Age: 59
End: 2024-03-11
Payer: COMMERCIAL

## 2024-03-11 VITALS — BODY MASS INDEX: 30.56 KG/M2 | HEIGHT: 68 IN

## 2024-03-11 VITALS
DIASTOLIC BLOOD PRESSURE: 82 MMHG | HEART RATE: 95 BPM | WEIGHT: 204 LBS | SYSTOLIC BLOOD PRESSURE: 127 MMHG | BODY MASS INDEX: 32.78 KG/M2 | OXYGEN SATURATION: 94 % | HEIGHT: 66 IN | RESPIRATION RATE: 17 BRPM | TEMPERATURE: 98.5 F

## 2024-03-11 DIAGNOSIS — K42.9 UMBILICAL HERNIA WITHOUT OBSTRUCTION AND WITHOUT GANGRENE: Primary | ICD-10-CM

## 2024-03-11 PROCEDURE — 3074F SYST BP LT 130 MM HG: CPT | Performed by: COLON & RECTAL SURGERY

## 2024-03-11 PROCEDURE — 3079F DIAST BP 80-89 MM HG: CPT | Performed by: COLON & RECTAL SURGERY

## 2024-03-11 PROCEDURE — 99204 OFFICE O/P NEW MOD 45 MIN: CPT | Performed by: COLON & RECTAL SURGERY

## 2024-03-11 RX ORDER — SEMAGLUTIDE 1.34 MG/ML
INJECTION, SOLUTION SUBCUTANEOUS
COMMUNITY
Start: 2024-01-17

## 2024-03-11 RX ORDER — ERGOCALCIFEROL 1.25 MG/1
50000 CAPSULE ORAL
COMMUNITY

## 2024-03-11 ASSESSMENT — PATIENT HEALTH QUESTIONNAIRE - PHQ9
SUM OF ALL RESPONSES TO PHQ QUESTIONS 1-9: 0
SUM OF ALL RESPONSES TO PHQ QUESTIONS 1-9: 0
SUM OF ALL RESPONSES TO PHQ9 QUESTIONS 1 & 2: 0
1. LITTLE INTEREST OR PLEASURE IN DOING THINGS: NOT AT ALL
2. FEELING DOWN, DEPRESSED OR HOPELESS: NOT AT ALL
SUM OF ALL RESPONSES TO PHQ QUESTIONS 1-9: 0
SUM OF ALL RESPONSES TO PHQ QUESTIONS 1-9: 0

## 2024-03-11 NOTE — PROGRESS NOTES
Identified pt with two pt identifiers (name and ). Reviewed chart in preparation for visit and have obtained necessary documentation.    Robbie Bustamante is a 58 y.o. male  Chief Complaint   Patient presents with    New Patient     Eval Umbilical Hernia      /82 (Site: Left Upper Arm, Position: Sitting)   Pulse 95   Temp 98.5 °F (36.9 °C) (Temporal)   Resp 17   Ht 1.676 m (5' 6\")   SpO2 94%   BMI 32.44 kg/m²

## 2024-03-13 ENCOUNTER — TELEPHONE (OUTPATIENT)
Age: 59
End: 2024-03-13

## 2024-03-13 ENCOUNTER — PREP FOR PROCEDURE (OUTPATIENT)
Age: 59
End: 2024-03-13

## 2024-03-13 PROBLEM — K42.9 UMBILICAL HERNIA: Status: ACTIVE | Noted: 2024-03-13

## 2024-03-13 NOTE — TELEPHONE ENCOUNTER
Attempted to contact patient regarding surgery date and time with Dr. Denney. No answer, left voicemail for a return call.

## 2024-04-03 ENCOUNTER — HOSPITAL ENCOUNTER (OUTPATIENT)
Facility: HOSPITAL | Age: 59
Discharge: HOME | End: 2024-04-06

## 2024-04-03 LAB
ANION GAP SERPL CALC-SCNC: 13 MMOL/L (ref 5–15)
BUN SERPL-MCNC: 24 MG/DL (ref 6–20)
BUN/CREAT SERPL: 16 (ref 12–20)
CA-I BLD-MCNC: 9.2 MG/DL (ref 8.5–10.1)
CHLORIDE SERPL-SCNC: 103 MMOL/L (ref 97–108)
CO2 SERPL-SCNC: 25 MMOL/L (ref 21–32)
CREAT SERPL-MCNC: 1.48 MG/DL (ref 0.7–1.3)
EKG ATRIAL RATE: 91 BPM
EKG DIAGNOSIS: NORMAL
EKG P AXIS: 34 DEGREES
EKG P-R INTERVAL: 154 MS
EKG Q-T INTERVAL: 339 MS
EKG QRS DURATION: 96 MS
EKG QTC CALCULATION (BAZETT): 415 MS
EKG R AXIS: 16 DEGREES
EKG T AXIS: 3 DEGREES
EKG VENTRICULAR RATE: 90 BPM
ERYTHROCYTE [DISTWIDTH] IN BLOOD BY AUTOMATED COUNT: 13.2 % (ref 11.5–14.5)
GLUCOSE SERPL-MCNC: 135 MG/DL (ref 65–100)
HCT VFR BLD AUTO: 44.2 % (ref 36.6–50.3)
HGB BLD-MCNC: 14.6 G/DL (ref 12.1–17)
MCH RBC QN AUTO: 29.1 PG (ref 26–34)
MCHC RBC AUTO-ENTMCNC: 33 G/DL (ref 30–36.5)
MCV RBC AUTO: 88.2 FL (ref 80–99)
NRBC # BLD: 0 K/UL (ref 0–0.01)
NRBC BLD-RTO: 0 PER 100 WBC
PLATELET # BLD AUTO: 196 K/UL (ref 150–400)
PMV BLD AUTO: 9.3 FL (ref 8.9–12.9)
POTASSIUM SERPL-SCNC: 4.2 MMOL/L (ref 3.5–5.1)
RBC # BLD AUTO: 5.01 M/UL (ref 4.1–5.7)
SODIUM SERPL-SCNC: 141 MMOL/L (ref 136–145)
WBC # BLD AUTO: 8.8 K/UL (ref 4.1–11.1)

## 2024-04-03 PROCEDURE — 80048 BASIC METABOLIC PNL TOTAL CA: CPT

## 2024-04-03 PROCEDURE — 36415 COLL VENOUS BLD VENIPUNCTURE: CPT

## 2024-04-03 PROCEDURE — 93005 ELECTROCARDIOGRAM TRACING: CPT | Performed by: COLON & RECTAL SURGERY

## 2024-04-03 PROCEDURE — 85027 COMPLETE CBC AUTOMATED: CPT

## 2024-04-03 NOTE — DISCHARGE INSTRUCTIONS
No lifting greater than 10/15 pounds, no strenuous activity  May shower starting tomorrow, no tub baths  Avoid making financial decisions for the remainder of the day today  Avoid driving for the remainder of the day today  Follow-up my office in 2 weeks

## 2024-04-08 ENCOUNTER — HOSPITAL ENCOUNTER (OUTPATIENT)
Facility: HOSPITAL | Age: 59
Setting detail: OUTPATIENT SURGERY
Discharge: HOME | End: 2024-04-08
Attending: COLON & RECTAL SURGERY | Admitting: COLON & RECTAL SURGERY
Payer: COMMERCIAL

## 2024-04-08 ENCOUNTER — ANESTHESIA EVENT (OUTPATIENT)
Facility: HOSPITAL | Age: 59
End: 2024-04-08
Payer: COMMERCIAL

## 2024-04-08 ENCOUNTER — ANESTHESIA (OUTPATIENT)
Facility: HOSPITAL | Age: 59
End: 2024-04-08
Payer: COMMERCIAL

## 2024-04-08 VITALS
WEIGHT: 203.7 LBS | TEMPERATURE: 97.7 F | DIASTOLIC BLOOD PRESSURE: 62 MMHG | BODY MASS INDEX: 30.87 KG/M2 | HEIGHT: 68 IN | HEART RATE: 85 BPM | SYSTOLIC BLOOD PRESSURE: 115 MMHG | RESPIRATION RATE: 20 BRPM | OXYGEN SATURATION: 95 %

## 2024-04-08 DIAGNOSIS — K42.9 UMBILICAL HERNIA WITHOUT OBSTRUCTION AND WITHOUT GANGRENE: Primary | ICD-10-CM

## 2024-04-08 LAB
GLUCOSE BLD STRIP.AUTO-MCNC: 120 MG/DL (ref 65–100)
PERFORMED BY:: ABNORMAL

## 2024-04-08 PROCEDURE — 88302 TISSUE EXAM BY PATHOLOGIST: CPT

## 2024-04-08 PROCEDURE — 49591 RPR AA HRN 1ST < 3 CM RDC: CPT | Performed by: COLON & RECTAL SURGERY

## 2024-04-08 PROCEDURE — 7100000001 HC PACU RECOVERY - ADDTL 15 MIN: Performed by: COLON & RECTAL SURGERY

## 2024-04-08 PROCEDURE — 3600000002 HC SURGERY LEVEL 2 BASE: Performed by: COLON & RECTAL SURGERY

## 2024-04-08 PROCEDURE — 7100000011 HC PHASE II RECOVERY - ADDTL 15 MIN: Performed by: COLON & RECTAL SURGERY

## 2024-04-08 PROCEDURE — 3700000000 HC ANESTHESIA ATTENDED CARE: Performed by: COLON & RECTAL SURGERY

## 2024-04-08 PROCEDURE — 3600000012 HC SURGERY LEVEL 2 ADDTL 15MIN: Performed by: COLON & RECTAL SURGERY

## 2024-04-08 PROCEDURE — 82962 GLUCOSE BLOOD TEST: CPT

## 2024-04-08 PROCEDURE — 7100000010 HC PHASE II RECOVERY - FIRST 15 MIN: Performed by: COLON & RECTAL SURGERY

## 2024-04-08 PROCEDURE — 2500000003 HC RX 250 WO HCPCS: Performed by: NURSE ANESTHETIST, CERTIFIED REGISTERED

## 2024-04-08 PROCEDURE — 2580000003 HC RX 258: Performed by: COLON & RECTAL SURGERY

## 2024-04-08 PROCEDURE — 6360000002 HC RX W HCPCS: Performed by: NURSE ANESTHETIST, CERTIFIED REGISTERED

## 2024-04-08 PROCEDURE — 3700000001 HC ADD 15 MINUTES (ANESTHESIA): Performed by: COLON & RECTAL SURGERY

## 2024-04-08 PROCEDURE — C1781 MESH (IMPLANTABLE): HCPCS | Performed by: COLON & RECTAL SURGERY

## 2024-04-08 PROCEDURE — 2709999900 HC NON-CHARGEABLE SUPPLY: Performed by: COLON & RECTAL SURGERY

## 2024-04-08 PROCEDURE — 2500000003 HC RX 250 WO HCPCS: Performed by: COLON & RECTAL SURGERY

## 2024-04-08 PROCEDURE — 7100000000 HC PACU RECOVERY - FIRST 15 MIN: Performed by: COLON & RECTAL SURGERY

## 2024-04-08 DEVICE — BARD VENTRALEX HERNIA PATCH, 6.4 CM (2.5"), MEDIUM CIRCLE WITH STRAP
Type: IMPLANTABLE DEVICE | Status: FUNCTIONAL
Brand: VENTRALEX

## 2024-04-08 RX ORDER — OXYCODONE HYDROCHLORIDE AND ACETAMINOPHEN 5; 325 MG/1; MG/1
2 TABLET ORAL EVERY 4 HOURS PRN
Status: DISCONTINUED | OUTPATIENT
Start: 2024-04-08 | End: 2024-04-08 | Stop reason: HOSPADM

## 2024-04-08 RX ORDER — EPHEDRINE SULFATE 50 MG/ML
INJECTION INTRAVENOUS PRN
Status: DISCONTINUED | OUTPATIENT
Start: 2024-04-08 | End: 2024-04-08 | Stop reason: SDUPTHER

## 2024-04-08 RX ORDER — KETOROLAC TROMETHAMINE 30 MG/ML
INJECTION, SOLUTION INTRAMUSCULAR; INTRAVENOUS PRN
Status: DISCONTINUED | OUTPATIENT
Start: 2024-04-08 | End: 2024-04-08 | Stop reason: SDUPTHER

## 2024-04-08 RX ORDER — NEOSTIGMINE METHYLSULFATE 1 MG/ML
INJECTION, SOLUTION INTRAVENOUS PRN
Status: DISCONTINUED | OUTPATIENT
Start: 2024-04-08 | End: 2024-04-08 | Stop reason: SDUPTHER

## 2024-04-08 RX ORDER — SODIUM CHLORIDE 0.9 % (FLUSH) 0.9 %
5-40 SYRINGE (ML) INJECTION PRN
Status: DISCONTINUED | OUTPATIENT
Start: 2024-04-08 | End: 2024-04-08 | Stop reason: HOSPADM

## 2024-04-08 RX ORDER — SODIUM CHLORIDE 9 MG/ML
INJECTION, SOLUTION INTRAVENOUS PRN
Status: DISCONTINUED | OUTPATIENT
Start: 2024-04-08 | End: 2024-04-08 | Stop reason: HOSPADM

## 2024-04-08 RX ORDER — VECURONIUM BROMIDE 1 MG/ML
INJECTION, POWDER, LYOPHILIZED, FOR SOLUTION INTRAVENOUS PRN
Status: DISCONTINUED | OUTPATIENT
Start: 2024-04-08 | End: 2024-04-08 | Stop reason: SDUPTHER

## 2024-04-08 RX ORDER — GLYCOPYRROLATE 0.2 MG/ML
INJECTION INTRAMUSCULAR; INTRAVENOUS PRN
Status: DISCONTINUED | OUTPATIENT
Start: 2024-04-08 | End: 2024-04-08 | Stop reason: SDUPTHER

## 2024-04-08 RX ORDER — DEXMEDETOMIDINE HYDROCHLORIDE 100 UG/ML
INJECTION, SOLUTION INTRAVENOUS PRN
Status: DISCONTINUED | OUTPATIENT
Start: 2024-04-08 | End: 2024-04-08 | Stop reason: SDUPTHER

## 2024-04-08 RX ORDER — OXYCODONE HYDROCHLORIDE AND ACETAMINOPHEN 5; 325 MG/1; MG/1
1 TABLET ORAL EVERY 4 HOURS PRN
Qty: 24 TABLET | Refills: 0 | Status: SHIPPED | OUTPATIENT
Start: 2024-04-08 | End: 2024-04-15

## 2024-04-08 RX ORDER — PROPOFOL 10 MG/ML
INJECTION, EMULSION INTRAVENOUS PRN
Status: DISCONTINUED | OUTPATIENT
Start: 2024-04-08 | End: 2024-04-08 | Stop reason: SDUPTHER

## 2024-04-08 RX ORDER — NALOXONE HYDROCHLORIDE 0.4 MG/ML
INJECTION, SOLUTION INTRAMUSCULAR; INTRAVENOUS; SUBCUTANEOUS PRN
Status: DISCONTINUED | OUTPATIENT
Start: 2024-04-08 | End: 2024-04-08 | Stop reason: HOSPADM

## 2024-04-08 RX ORDER — SODIUM CHLORIDE 9 MG/ML
INJECTION, SOLUTION INTRAVENOUS CONTINUOUS
Status: DISCONTINUED | OUTPATIENT
Start: 2024-04-08 | End: 2024-04-08 | Stop reason: HOSPADM

## 2024-04-08 RX ORDER — DEXAMETHASONE SODIUM PHOSPHATE 10 MG/ML
INJECTION, SOLUTION INTRAMUSCULAR; INTRAVENOUS PRN
Status: DISCONTINUED | OUTPATIENT
Start: 2024-04-08 | End: 2024-04-08 | Stop reason: SDUPTHER

## 2024-04-08 RX ORDER — MIDAZOLAM HYDROCHLORIDE 1 MG/ML
INJECTION INTRAMUSCULAR; INTRAVENOUS PRN
Status: DISCONTINUED | OUTPATIENT
Start: 2024-04-08 | End: 2024-04-08 | Stop reason: SDUPTHER

## 2024-04-08 RX ORDER — BUPIVACAINE HYDROCHLORIDE AND EPINEPHRINE 5; 5 MG/ML; UG/ML
INJECTION, SOLUTION EPIDURAL; INTRACAUDAL; PERINEURAL PRN
Status: DISCONTINUED | OUTPATIENT
Start: 2024-04-08 | End: 2024-04-08 | Stop reason: ALTCHOICE

## 2024-04-08 RX ORDER — FENTANYL CITRATE 50 UG/ML
INJECTION, SOLUTION INTRAMUSCULAR; INTRAVENOUS PRN
Status: DISCONTINUED | OUTPATIENT
Start: 2024-04-08 | End: 2024-04-08 | Stop reason: SDUPTHER

## 2024-04-08 RX ORDER — CEFAZOLIN SODIUM 1 G/3ML
INJECTION, POWDER, FOR SOLUTION INTRAMUSCULAR; INTRAVENOUS PRN
Status: DISCONTINUED | OUTPATIENT
Start: 2024-04-08 | End: 2024-04-08 | Stop reason: SDUPTHER

## 2024-04-08 RX ORDER — SUCCINYLCHOLINE/SOD CL,ISO/PF 200MG/10ML
SYRINGE (ML) INTRAVENOUS PRN
Status: DISCONTINUED | OUTPATIENT
Start: 2024-04-08 | End: 2024-04-08 | Stop reason: SDUPTHER

## 2024-04-08 RX ORDER — ONDANSETRON 2 MG/ML
INJECTION INTRAMUSCULAR; INTRAVENOUS PRN
Status: DISCONTINUED | OUTPATIENT
Start: 2024-04-08 | End: 2024-04-08 | Stop reason: SDUPTHER

## 2024-04-08 RX ORDER — SODIUM CHLORIDE 0.9 % (FLUSH) 0.9 %
5-40 SYRINGE (ML) INJECTION EVERY 12 HOURS SCHEDULED
Status: DISCONTINUED | OUTPATIENT
Start: 2024-04-08 | End: 2024-04-08 | Stop reason: HOSPADM

## 2024-04-08 RX ORDER — LIDOCAINE HYDROCHLORIDE 20 MG/ML
INJECTION, SOLUTION EPIDURAL; INFILTRATION; INTRACAUDAL; PERINEURAL PRN
Status: DISCONTINUED | OUTPATIENT
Start: 2024-04-08 | End: 2024-04-08 | Stop reason: SDUPTHER

## 2024-04-08 RX ADMIN — EPHEDRINE SULFATE 10 MG: 50 INJECTION INTRAVENOUS at 09:16

## 2024-04-08 RX ADMIN — ONDANSETRON 4 MG: 2 INJECTION INTRAMUSCULAR; INTRAVENOUS at 09:11

## 2024-04-08 RX ADMIN — DEXMEDETOMIDINE HCL 8 MCG: 100 INJECTION INTRAVENOUS at 09:05

## 2024-04-08 RX ADMIN — MIDAZOLAM 2 MG: 1 INJECTION INTRAMUSCULAR; INTRAVENOUS at 08:47

## 2024-04-08 RX ADMIN — VECURONIUM BROMIDE 5 MG: 10 INJECTION, POWDER, LYOPHILIZED, FOR SOLUTION INTRAVENOUS at 09:03

## 2024-04-08 RX ADMIN — FENTANYL CITRATE 100 MCG: 50 INJECTION, SOLUTION INTRAMUSCULAR; INTRAVENOUS at 09:00

## 2024-04-08 RX ADMIN — KETOROLAC TROMETHAMINE 30 MG: 30 INJECTION INTRAMUSCULAR; INTRAVENOUS at 09:41

## 2024-04-08 RX ADMIN — DEXMEDETOMIDINE HCL 4 MCG: 100 INJECTION INTRAVENOUS at 09:15

## 2024-04-08 RX ADMIN — CEFAZOLIN 2 G: 1 INJECTION, POWDER, FOR SOLUTION INTRAMUSCULAR; INTRAVENOUS at 09:02

## 2024-04-08 RX ADMIN — GLYCOPYRROLATE 0.4 MG: 0.2 INJECTION INTRAMUSCULAR; INTRAVENOUS at 09:41

## 2024-04-08 RX ADMIN — DEXAMETHASONE SODIUM PHOSPHATE 10 MG: 10 INJECTION INTRAMUSCULAR; INTRAVENOUS at 09:11

## 2024-04-08 RX ADMIN — Medication 100 MG: at 08:55

## 2024-04-08 RX ADMIN — PROPOFOL 200 MG: 10 INJECTION, EMULSION INTRAVENOUS at 08:55

## 2024-04-08 RX ADMIN — LIDOCAINE HYDROCHLORIDE 60 MG: 20 INJECTION, SOLUTION EPIDURAL; INFILTRATION; INTRACAUDAL; PERINEURAL at 08:55

## 2024-04-08 RX ADMIN — SODIUM CHLORIDE: 9 INJECTION, SOLUTION INTRAVENOUS at 07:15

## 2024-04-08 RX ADMIN — EPHEDRINE SULFATE 10 MG: 50 INJECTION INTRAVENOUS at 09:09

## 2024-04-08 RX ADMIN — NEOSTIGMINE METHYLSULFATE 3 MG: 1 INJECTION INTRAVENOUS at 09:41

## 2024-04-08 ASSESSMENT — PAIN SCALES - GENERAL
PAINLEVEL_OUTOF10: 1
PAINLEVEL_OUTOF10: 0
PAINLEVEL_OUTOF10: 2
PAINLEVEL_OUTOF10: 2
PAINLEVEL_OUTOF10: 0

## 2024-04-08 ASSESSMENT — PAIN - FUNCTIONAL ASSESSMENT
PAIN_FUNCTIONAL_ASSESSMENT: 0-10
PAIN_FUNCTIONAL_ASSESSMENT: 0-10

## 2024-04-08 NOTE — ANESTHESIA PRE PROCEDURE
Department of Anesthesiology  Preprocedure Note       Name:  Robbie Bustamante   Age:  58 y.o.  :  1965                                          MRN:  [id number]         Date:  2024      Surgeon: Surgeon(s):  Jorge Denney MD    Procedure: Procedure(s):  REPAIR UMBILICAL HERNIA WITH MESH    Medications prior to admission:   Prior to Admission medications    Medication Sig Start Date End Date Taking? Authorizing Provider   OZEMPIC, 1 MG/DOSE, 4 MG/3ML SOPN  24   ProviderLindsay MD   vitamin D (ERGOCALCIFEROL) 1.25 MG (86430 UT) CAPS capsule Take 1 capsule by mouth three times a week  Patient not taking: Reported on 4/3/2024    ProviderLindsay MD   amLODIPine-benazepril (LOTREL) 10-20 MG per capsule Take 1 capsule by mouth daily    Automatic Reconciliation, Ar   atorvastatin (LIPITOR) 80 MG tablet Take 1 tablet by mouth daily    Automatic Reconciliation, Ar   dapagliflozin (FARXIGA) 10 MG tablet Take 1 tablet by mouth daily    Automatic Reconciliation, Ar   metFORMIN, OSM, (FORTAMET) 1000 MG extended release tablet Take 1 tablet by mouth 2 times daily    Automatic Reconciliation, Ar       Current medications:    Current Facility-Administered Medications   Medication Dose Route Frequency Provider Last Rate Last Admin   • 0.9 % sodium chloride infusion   IntraVENous Continuous Jorge Denney  mL/hr at 24 0715 New Bag at 24 0715       Allergies:  No Known Allergies    Problem List:    Patient Active Problem List   Diagnosis Code   • Type 2 diabetes mellitus with hyperosmolarity without coma, without long-term current use of insulin (HCC) E11.00   • Benign essential HTN I10   • Mixed hyperlipidemia E78.2   • Umbilical hernia K42.9       Past Medical History:        Diagnosis Date   • Arthritis    • Diabetes (HCC)    • Gout    • High cholesterol    • Hypertension    • Nausea & vomiting        Past Surgical History:        Procedure Laterality Date   •

## 2024-04-08 NOTE — H&P (VIEW-ONLY)
OFFICE VISIT NOTE    Robbie Bustamante is a 58 y.o. male who presents to the office today for:    Chief Complaint   Patient presents with    New Patient     Eval Umbilical Hernia        Patient is 58-year-old gentleman referred for evaluation of umbilical hernia.  He states been present quite some time and intermittently does cause him some discomfort.  He believes it is increased in size recently.  Denies any obstructive symptoms.    His past medical history is significant for history of hypertension, hypercholesterolemia, type 2 diabetes.        Past Medical History:   Diagnosis Date    Arthritis     Diabetes (HCC)     Gout     High cholesterol     Hypertension     Nausea & vomiting        Past Surgical History:   Procedure Laterality Date    COLONOSCOPY N/A 12/10/2021    COLONOSCOPY (MARIANN SUAREZ) performed by Claudette Hutchinson MD at [facility] ENDOSCOPY    KNEE ARTHROSCOPY Left        Family History   Problem Relation Age of Onset    Kidney Disease Father     Hypertension Father     No Known Problems Mother        Social History     Socioeconomic History    Marital status:      Spouse name: Not on file    Number of children: Not on file    Years of education: Not on file    Highest education level: Not on file   Occupational History    Not on file   Tobacco Use    Smoking status: Never    Smokeless tobacco: Never   Vaping Use    Vaping Use: Never used   Substance and Sexual Activity    Alcohol use: Never    Drug use: Never    Sexual activity: Not on file   Other Topics Concern    Not on file   Social History Narrative    Not on file     Social Determinants of Health     Financial Resource Strain: Not on file   Food Insecurity: Not on file   Transportation Needs: Not on file   Physical Activity: Not on file   Stress: Not on file   Social Connections: Not on file   Intimate Partner Violence: Not on

## 2024-04-08 NOTE — OP NOTE
Operative Note      Patient: Robbie Bustamante  YOB: 1965  MRN: [id number]    Date of Procedure: 4/8/2024    Pre-Op Diagnosis Codes:     * Umbilical hernia [K42.9]    Post-Op Diagnosis: Same       Procedure(s):  REPAIR UMBILICAL HERNIA WITH MESH    Surgeon(s):  Jorge Denney MD    Assistant:   * No surgical staff found *    Anesthesia: General    Estimated Blood Loss (mL): Minimal    Complications: None    Specimens:   ID Type Source Tests Collected by Time Destination   1 : hernia sac and contents Tissue Abdomen SURGICAL PATHOLOGY Jorge Denney MD 4/8/2024 0925        Implants:  Implant Name Type Inv. Item Serial No.  Lot No. LRB No. Used Action   MESH JOZEF M DIA6.4CM VENTRAL POLYPR EPTFE CIR SELF EXP PTCH - [id number] Mesh MESH JOZEF M DIA6.4CM VENTRAL POLYPR EPTFE CIR SELF EXP PTCH  BARD DAVOL-WD  N/A 1 Implanted         Drains: * No LDAs found *    Findings:  Infection Present At Time Of Surgery (PATOS) (choose all levels that have infection present):  No infection present  Other Findings: Approximately 1.5 cm hernia defect    Detailed Description of Procedure: The patient was brought to the operating room and positioned on the OR table in the supine position.  Following the induction of general anesthesia the abdomen was prepped and draped in the standard sterile fashion and a surgical timeout was performed at which time the patient's identity and surgical procedure were once again confirmed.    A small curvilinear incision was made infraumbilically and taken down through subtenons tissues electrocautery.  The hernia sac was circumferentially dissected free of subtenons attachments and dissected off of the umbilical skin.  The hernia sac was then opened down to the level of the fascia and transected.  There was preperitoneal fat that was within the operative field was transected and passed off as specimen.  Specimen consists of hernia sac and contents.    Kocher

## 2024-04-08 NOTE — PROGRESS NOTES
OFFICE VISIT NOTE    Robbie Bustamante is a 58 y.o. male who presents to the office today for:    Chief Complaint   Patient presents with    New Patient     Eval Umbilical Hernia        Patient is 58-year-old gentleman referred for evaluation of umbilical hernia.  He states been present quite some time and intermittently does cause him some discomfort.  He believes it is increased in size recently.  Denies any obstructive symptoms.    His past medical history is significant for history of hypertension, hypercholesterolemia, type 2 diabetes.        Past Medical History:   Diagnosis Date    Arthritis     Diabetes (HCC)     Gout     High cholesterol     Hypertension     Nausea & vomiting        Past Surgical History:   Procedure Laterality Date    COLONOSCOPY N/A 12/10/2021    COLONOSCOPY (MARIANN SUAREZ) performed by Claudette Hutchinson MD at [facility] ENDOSCOPY    KNEE ARTHROSCOPY Left        Family History   Problem Relation Age of Onset    Kidney Disease Father     Hypertension Father     No Known Problems Mother        Social History     Socioeconomic History    Marital status:      Spouse name: Not on file    Number of children: Not on file    Years of education: Not on file    Highest education level: Not on file   Occupational History    Not on file   Tobacco Use    Smoking status: Never    Smokeless tobacco: Never   Vaping Use    Vaping Use: Never used   Substance and Sexual Activity    Alcohol use: Never    Drug use: Never    Sexual activity: Not on file   Other Topics Concern    Not on file   Social History Narrative    Not on file     Social Determinants of Health     Financial Resource Strain: Not on file   Food Insecurity: Not on file   Transportation Needs: Not on file   Physical Activity: Not on file   Stress: Not on file   Social Connections: Not on file   Intimate Partner Violence: Not on

## 2024-04-08 NOTE — ANESTHESIA POSTPROCEDURE EVALUATION
Department of Anesthesiology  Postprocedure Note    Patient: Robbie Bustamante  MRN: [id number]  YOB: 1965  Date of evaluation: 4/8/2024    Procedure Summary       Date: 04/08/24 Room / Location: [facility] MAIN OR 01 / [facility] MAIN OR    Anesthesia Start: 0847 Anesthesia Stop: 1013    Procedure: REPAIR UMBILICAL HERNIA WITH MESH (Abdomen) Diagnosis:       Umbilical hernia      (Umbilical hernia [K42.9])    Surgeons: Jorge Denney MD Responsible Provider: Robbie Ruffin APRN - CRNA    Anesthesia Type: General ASA Status: 2            Anesthesia Type: General    Moises Phase I: Moises Score: 10    Moises Phase II:      Anesthesia Post Evaluation    Patient location during evaluation: PACU  Patient participation: complete - patient participated  Level of consciousness: sleepy but conscious  Pain score: 0  Airway patency: patent  Nausea & Vomiting: no vomiting and no nausea  Cardiovascular status: blood pressure returned to baseline  Respiratory status: room air  Hydration status: stable  Multimodal analgesia pain management approach  Pain management: satisfactory to patient and adequate    No notable events documented.

## 2024-04-08 NOTE — INTERVAL H&P NOTE
Update History & Physical    The patient's History and Physical of March 11, 2024 was reviewed with the patient and I examined the patient. There was no change. The surgical site was confirmed by the patient and me.     Plan: The risks, benefits, expected outcome, and alternative to the recommended procedure have been discussed with the patient. Patient understands and wants to proceed with the procedure.     Electronically signed by KESHA JULES MD on 4/8/2024 at 8:38 AM

## 2024-04-09 ENCOUNTER — TELEPHONE (OUTPATIENT)
Age: 59
End: 2024-04-09

## 2024-04-09 NOTE — TELEPHONE ENCOUNTER
Perfect serve sent to provider to just make sure its okay for him to go back to work on 04/15/2024 with restrictions.

## 2024-04-09 NOTE — TELEPHONE ENCOUNTER
Per Dr.Akbari troy to send him the note. Note was put in the system and sent to the patient on [employer].

## 2024-04-09 NOTE — TELEPHONE ENCOUNTER
Patient needs a work note stating nothing of 10 to 15 lbs and return on 4/15/24 or the following week.    [phone]

## 2024-04-15 ENCOUNTER — TELEPHONE (OUTPATIENT)
Age: 59
End: 2024-04-15

## 2024-04-15 NOTE — TELEPHONE ENCOUNTER
Patient is wanting a work note for today , I told him we would have to ask. Please contact patient

## 2024-04-15 NOTE — TELEPHONE ENCOUNTER
Called and spoke with Robbie Bustamante 2 patient identifiers used. Patient states he is going to try and go back to work on Wednesday.

## 2024-04-29 ENCOUNTER — OFFICE VISIT (OUTPATIENT)
Age: 59
End: 2024-04-29
Payer: COMMERCIAL

## 2024-04-29 VITALS
RESPIRATION RATE: 18 BRPM | BODY MASS INDEX: 30.1 KG/M2 | HEIGHT: 68 IN | OXYGEN SATURATION: 92 % | WEIGHT: 198.6 LBS | TEMPERATURE: 98.2 F | DIASTOLIC BLOOD PRESSURE: 85 MMHG | HEART RATE: 100 BPM | SYSTOLIC BLOOD PRESSURE: 132 MMHG

## 2024-04-29 DIAGNOSIS — K42.9 UMBILICAL HERNIA WITHOUT OBSTRUCTION AND WITHOUT GANGRENE: Primary | ICD-10-CM

## 2024-04-29 PROCEDURE — 3075F SYST BP GE 130 - 139MM HG: CPT | Performed by: COLON & RECTAL SURGERY

## 2024-04-29 PROCEDURE — 99213 OFFICE O/P EST LOW 20 MIN: CPT | Performed by: COLON & RECTAL SURGERY

## 2024-04-29 PROCEDURE — 3079F DIAST BP 80-89 MM HG: CPT | Performed by: COLON & RECTAL SURGERY

## 2024-04-29 ASSESSMENT — PATIENT HEALTH QUESTIONNAIRE - PHQ9
2. FEELING DOWN, DEPRESSED OR HOPELESS: NOT AT ALL
SUM OF ALL RESPONSES TO PHQ QUESTIONS 1-9: 0
SUM OF ALL RESPONSES TO PHQ9 QUESTIONS 1 & 2: 0
SUM OF ALL RESPONSES TO PHQ QUESTIONS 1-9: 0
1. LITTLE INTEREST OR PLEASURE IN DOING THINGS: NOT AT ALL

## 2024-04-29 NOTE — PROGRESS NOTES
Identified pt with two pt identifiers (name and ). Reviewed chart in preparation for visit and have obtained necessary documentation.    Robbie Bustamante is a 58 y.o. male  Chief Complaint   Patient presents with    Post-Op Check     Umbilical Hernia Repair      /85 (Site: Right Upper Arm, Position: Sitting, Cuff Size: Large Adult)   Pulse 100   Temp 98.2 °F (36.8 °C) (Oral)   Resp 18   Ht 1.727 m (5' 8\")   Wt 90.1 kg (198 lb 9.6 oz)   SpO2 92%   BMI 30.20 kg/m²     1. Have you been to the ER, urgent care clinic since your last visit?  Hospitalized since your last visit?NO    2. Have you seen or consulted any other health care providers outside of the [facility] since your last visit?  Include any pap smears or colon screening. NO

## 2024-05-08 NOTE — PROGRESS NOTES
OFFICE VISIT NOTE    Robbie Bustamante is a 58 y.o. male who presents to the office today for:    Chief Complaint   Patient presents with    Post-Op Check     Umbilical Hernia Repair        Mr. Bustamante comes in today for follow-up status post repair of umbilical hernia with mesh.  Her surgery was approximately 3 weeks ago.  He has no complaints today.  He denies any complications as incision.  He denies noticing any recurrent or persistent hernia.  He is tolerating a diet with no nausea or vomiting.        Past Medical History:   Diagnosis Date    Arthritis     Diabetes (HCC)     Gout     High cholesterol     Hypertension     Nausea & vomiting        Past Surgical History:   Procedure Laterality Date    COLONOSCOPY N/A 12/10/2021    COLONOSCOPY (ANES TIVA) performed by Claudette Hutchinson MD at [facility] ENDOSCOPY    KNEE ARTHROSCOPY Left     UMBILICAL HERNIA REPAIR N/A 4/8/2024    REPAIR UMBILICAL HERNIA WITH MESH performed by Jorge Denney MD at [facility] MAIN OR       Family History   Problem Relation Age of Onset    Kidney Disease Father     Hypertension Father     No Known Problems Mother        Social History     Socioeconomic History    Marital status:      Spouse name: Not on file    Number of children: Not on file    Years of education: Not on file    Highest education level: Not on file   Occupational History    Not on file   Tobacco Use    Smoking status: Never    Smokeless tobacco: Never   Vaping Use    Vaping Use: Never used   Substance and Sexual Activity    Alcohol use: Never    Drug use: Never    Sexual activity: Not on file   Other Topics Concern    Not on file   Social History Narrative    Not on file     Social Determinants of Health     Financial Resource Strain: Not on file   Food Insecurity: Not on file   Transportation Needs: Not on file   Physical Activity: Not on file

## 2024-06-10 ENCOUNTER — OFFICE VISIT (OUTPATIENT)
Age: 59
End: 2024-06-10

## 2024-06-10 VITALS
BODY MASS INDEX: 32.3 KG/M2 | RESPIRATION RATE: 18 BRPM | HEART RATE: 93 BPM | SYSTOLIC BLOOD PRESSURE: 127 MMHG | OXYGEN SATURATION: 92 % | HEIGHT: 67 IN | WEIGHT: 205.8 LBS | DIASTOLIC BLOOD PRESSURE: 80 MMHG

## 2024-06-10 DIAGNOSIS — Z09 S/P UMBILICAL HERNIA REPAIR, FOLLOW-UP EXAM: Primary | ICD-10-CM

## 2024-06-10 ASSESSMENT — PATIENT HEALTH QUESTIONNAIRE - PHQ9
SUM OF ALL RESPONSES TO PHQ QUESTIONS 1-9: 0
1. LITTLE INTEREST OR PLEASURE IN DOING THINGS: NOT AT ALL
SUM OF ALL RESPONSES TO PHQ9 QUESTIONS 1 & 2: 0
2. FEELING DOWN, DEPRESSED OR HOPELESS: NOT AT ALL

## 2024-06-10 NOTE — PROGRESS NOTES
Chief Complaint   Patient presents with    Follow-up     6 week fup     \"Have you been to the ER, urgent care clinic since your last visit?  Hospitalized since your last visit?\"    NO    “Have you seen or consulted any other health care providers outside of [facility] since your last visit?”    NO            Click Here for Release of Records Request

## 2024-06-12 NOTE — PROGRESS NOTES
[facility]      Clinic Note - Follow up    Subjective     Robbie Bustamante returns for scheduled follow up today.  He underwent an open umbilical hernia repair with mesh 4/8/2024 by Dr. Denney.  He is doing very well.  He denies abdominal pain, nausea, vomiting, diarrhea, constipation.  He denies fever or chills.  He denies chest pain or shortness of breath.    Objective     /80 (Site: Left Upper Arm, Position: Sitting, Cuff Size: Medium Adult)   Pulse 93   Resp 18   Ht 1.702 m (5' 7\")   Wt 93.4 kg (205 lb 12.8 oz)   SpO2 92%   BMI 32.23 kg/m²       PE  GEN - Awake, alert, communicating appropriately.  NAD  Pulm - NWAB  CV - RRR  Abd - soft, NT, ND.  Healed with no evidence of infection no cellulitis no drainage          Assessment     Robbie Bustamante is a 58 y.o.yr old male status post open umbilical hernia repair with mesh 4/8/2024 by Dr. Denney.  Doing well    Plan     Follow-up again in 6 months        Mercy Hernandez MD  6/12/2024    10 mins of time was spent with the patient including reviewing chart, history and physical examination, reviewing labs and imaging and discussing treatment plan with patient .

## 2024-12-03 ENCOUNTER — HOSPITAL ENCOUNTER (OUTPATIENT)
Facility: HOSPITAL | Age: 59
Discharge: HOME | End: 2024-12-06
Payer: COMMERCIAL

## 2024-12-03 DIAGNOSIS — M75.101 TEAR OF RIGHT ROTATOR CUFF, UNSPECIFIED TEAR EXTENT, UNSPECIFIED WHETHER TRAUMATIC: ICD-10-CM

## 2024-12-03 PROCEDURE — 73221 MRI JOINT UPR EXTREM W/O DYE: CPT

## 2024-12-24 NOTE — PROGRESS NOTES
Chart review completed all necessary documentation appears to be present.  Identified patient with two patient identifiers (name and ). Reviewed chart in preparation for visit and have obtained necessary documentation.    Robbie Bustamante is a 59 y.o. male  Chief Complaint   Patient presents with    Follow-up     Hernia repair     /80 (Site: Left Upper Arm, Position: Sitting, Cuff Size: Large Adult)   Pulse (!) 108   Temp 97.9 °F (36.6 °C) (Oral)   Resp 18   Ht 1.702 m (5' 7\")   Wt 89.2 kg (196 lb 9.6 oz)   SpO2 92%   BMI 30.79 kg/m²     1. Have you been to the ER, urgent care clinic since your last visit?  Hospitalized since your last visit?no    2. Have you seen or consulted any other health care providers outside of the [facility] since your last visit?  Include any pap smears or colon screening. yes

## 2024-12-30 ENCOUNTER — OFFICE VISIT (OUTPATIENT)
Age: 59
End: 2024-12-30
Payer: COMMERCIAL

## 2024-12-30 VITALS
HEIGHT: 67 IN | OXYGEN SATURATION: 92 % | DIASTOLIC BLOOD PRESSURE: 80 MMHG | WEIGHT: 196.6 LBS | HEART RATE: 103 BPM | SYSTOLIC BLOOD PRESSURE: 119 MMHG | RESPIRATION RATE: 18 BRPM | BODY MASS INDEX: 30.86 KG/M2 | TEMPERATURE: 97.9 F

## 2024-12-30 DIAGNOSIS — Z09 S/P UMBILICAL HERNIA REPAIR, FOLLOW-UP EXAM: Primary | ICD-10-CM

## 2024-12-30 PROCEDURE — 99212 OFFICE O/P EST SF 10 MIN: CPT | Performed by: SURGERY

## 2024-12-30 PROCEDURE — 3079F DIAST BP 80-89 MM HG: CPT | Performed by: SURGERY

## 2024-12-30 PROCEDURE — 3074F SYST BP LT 130 MM HG: CPT | Performed by: SURGERY

## 2024-12-30 RX ORDER — TIRZEPATIDE 10 MG/.5ML
10 INJECTION, SOLUTION SUBCUTANEOUS WEEKLY
COMMUNITY
Start: 2024-12-19

## 2024-12-30 ASSESSMENT — PATIENT HEALTH QUESTIONNAIRE - PHQ9
SUM OF ALL RESPONSES TO PHQ9 QUESTIONS 1 & 2: 0
1. LITTLE INTEREST OR PLEASURE IN DOING THINGS: NOT AT ALL
SUM OF ALL RESPONSES TO PHQ QUESTIONS 1-9: 0
2. FEELING DOWN, DEPRESSED OR HOPELESS: NOT AT ALL

## 2025-01-02 NOTE — PROGRESS NOTES
[facility]      Clinic Note - Follow up    Subjective     Robbie Bustamante returns for scheduled follow up today.    He underwent an open umbilical hernia repair with mesh 4/8/2024 by Dr. Denney. He is doing very well. He denies abdominal pain, nausea, vomiting, diarrhea, constipation. He denies fever or chills. He denies chest pain or shortness of breath.     Objective     /80 (Site: Left Upper Arm, Position: Sitting, Cuff Size: Large Adult)   Pulse (!) 103   Temp 97.9 °F (36.6 °C) (Oral)   Resp 18   Ht 1.702 m (5' 7\")   Wt 89.2 kg (196 lb 9.6 oz)   SpO2 92%   BMI 30.79 kg/m²       PE  GEN - Awake, alert, communicating appropriately.  NAD  Pulm - NWAB  CV - RRR  Abd - soft, NT, ND. Healed with no evidence of infection no cellulitis no drainage; no evidence of recurrence          Assessment     Robbie Bustamante is a 59 y.o.yr old male status post open umbilical hernia repair with mesh 4/8/2024 by Dr. Denney. Doing well     Plan     Follow up PRN        Mercy Hernandez MD  1/1/2025    13 mins of time was spent with the patient including reviewing chart, history and physical examination, reviewing labs and imaging and discussing treatment plan with patient .

## 2025-01-27 ENCOUNTER — HOSPITAL ENCOUNTER (OUTPATIENT)
Facility: HOSPITAL | Age: 60
Setting detail: RECURRING SERIES
Discharge: HOME | End: 2025-01-30
Payer: COMMERCIAL

## 2025-01-27 PROCEDURE — 97161 PT EVAL LOW COMPLEX 20 MIN: CPT

## 2025-01-27 PROCEDURE — 97110 THERAPEUTIC EXERCISES: CPT

## 2025-01-27 NOTE — THERAPY EVALUATION
function, decrease ADL/functional abilities, decrease activity tolerance, decrease flexibility/joint mobility, and decrease transfer abilities    Treatment Plan may include any combination of the followin Therapeutic Exercise, 16894 Therapeutic Activity, 19572 Neuromuscular Re-Education, 77645 Manual Therapy, 55475 Electrical Stim unattended, and 49621 Vasopneumatic Device  Patient / Family readiness to learn indicated by: asking questions, trying to perform skills, interest, and return verbalization   Persons(s) to be included in education: patient (P)  Barriers to Learning/Limitations: none  Measures taken if barriers to learning present: No measure required at this time.   Patient Self Reported Health Status: good  Rehabilitation Potential: good      PRECAUTIONS:  None at this time       Date of Initial Evaluation: 2025  Date of last Progress Note: n/a  Visit # of last Progress Note: 1     Number of Insurance Authorized visits: To Be Determined        Therapeutic Exercise Flow Sheet:                                                                                    Running Visit #    EXERCISE   1   2   3   4   5   6   7   8   9   10     Table slides x 3   X20 ea                  Shoulder rolls   x20                  Scapular squeeze   x20                                                                                                                                               Modalities to be included future treatment sessions: Vasopneumatic Compression  Has HEP been provided to patient? [] No    [x] Yes                                      Access Code:            [email or web address]        Date Provided: 2025  [] Reviewed HEP    [] Progressed/Changed HEP, details:        GOALS  Short Term Goals, to be met within 5 treatments:  Patient will demonstrate independence and compliance with HEP in order to increase mobility and assist with carryover from PT services.  Status at last Eval/Progress Note:

## 2025-01-30 ENCOUNTER — APPOINTMENT (OUTPATIENT)
Facility: HOSPITAL | Age: 60
End: 2025-01-30
Payer: COMMERCIAL

## 2025-02-03 ENCOUNTER — HOSPITAL ENCOUNTER (OUTPATIENT)
Facility: HOSPITAL | Age: 60
Setting detail: RECURRING SERIES
Discharge: HOME | End: 2025-02-06
Payer: COMMERCIAL

## 2025-02-03 PROCEDURE — 97110 THERAPEUTIC EXERCISES: CPT

## 2025-02-03 NOTE — PROGRESS NOTES
PHYSICAL THERAPY - DAILY TREATMENT NOTE (updated 3/23)    Date of Service: 2/3/2025        Patient Name:  Robbie Bustamante :  1965   Medical   Diagnosis:  Complete rotator cuff tear or rupture of right shoulder, not specified as traumatic [M75.121]  Impingement syndrome of right shoulder [M75.41] Treatment Diagnosis:  M25.511  RIGHT SHOULDER PAIN and M79.621  Pain in right upper arm    Referral Source:  Heladio Suazo MD Insurance:   Payor: [facility] / Plan: [facility] / Product Type: *No Product type* /     [x] Patient's date of birth verified                Visit #   Current  / Total 2 10   Time   In / Out 1555 1644   Total Treatment Time (in minutes) 49   Total Timed Codes  (in minutes) 39   [facility] Totals Reminder:    8-22 min = 1 unit; 23-37 min = 2 units; 38-52 min = 3 units;  53-67 min = 4 units; 68-82 min = 5 units      SUBJECTIVE  Pain Level (0-10 scale): 0/10    Any medication changes, allergies to medications, adverse drug reactions, diagnosis change, or new procedure performed?: No  Medications: [x]  Verified on Patient Summary List    Subjective functional status/changes:     \"Pt reports compliance with use of sling an dno other c/o.\"    OBJECTIVE  Therapeutic Procedures:  Tx Min Billable or 1:1 Min (if diff from Tx Min) Procedure, Rationale, Specifics   39 39 29006 Therapeutic Exercise (timed):  increase ROM, strength, coordination, balance, and proprioception to improve patient's ability to progress to PLOF and address remaining functional goals. (see flow sheet as applicable)        39 39    Total Total   [x] Patient Education billed concurrently with other procedures    Modalities Rationale:     decrease edema, decrease inflammation, and decrease pain to improve patient's ability to progress to PLOF and address remaining functional goals.       min [] Estim Unattended,             []  NMES  [] PreMod       []  IFC  [] Other:  []w/US   []w/ice   []w/heat  Position:  Location:            min []

## 2025-02-05 ENCOUNTER — HOSPITAL ENCOUNTER (OUTPATIENT)
Facility: HOSPITAL | Age: 60
Setting detail: RECURRING SERIES
Discharge: HOME | End: 2025-02-08
Payer: COMMERCIAL

## 2025-02-05 PROCEDURE — 97110 THERAPEUTIC EXERCISES: CPT

## 2025-02-05 NOTE — PROGRESS NOTES
Eval/Progress Note: 120 degrees  Current Status: 120 degrees  Goal Met?  No     2.  Patient will increase right shoulder flexion mmt to 4+/5 in order to improve function.  Status at last Eval/Progress Note: 3-/5  Current Status:  3-/5  Goal Met?  No     3. Patient will increase right shoulder abduction mmt to 4+/5 in order to improve function.  Status at last Eval/Progress Note: 3-/5  Current Status: 3-/5  Goal Met?  No       []  See Progress Note/Recertification  []  See Discharge Summary    PLAN  [x]  Continue plan of care  [x]  Upgrade activities as tolerated  []  Discharge due to :  []  Other:      Misty Campos, PT,        2/5/2025       8:15 AM

## 2025-02-10 ENCOUNTER — HOSPITAL ENCOUNTER (OUTPATIENT)
Facility: HOSPITAL | Age: 60
Setting detail: RECURRING SERIES
Discharge: HOME | End: 2025-02-13
Payer: COMMERCIAL

## 2025-02-10 PROCEDURE — 97016 VASOPNEUMATIC DEVICE THERAPY: CPT

## 2025-02-10 PROCEDURE — 97110 THERAPEUTIC EXERCISES: CPT

## 2025-02-10 NOTE — PROGRESS NOTES
last Eval/Progress Note: 120 degrees  Current Status: 120 degrees  Goal Met?  No     2.  Patient will increase right shoulder flexion mmt to 4+/5 in order to improve function.  Status at last Eval/Progress Note: 3-/5  Current Status:  3-/5  Goal Met?  No     3. Patient will increase right shoulder abduction mmt to 4+/5 in order to improve function.  Status at last Eval/Progress Note: 3-/5  Current Status: 3-/5  Goal Met?  No       []  See Progress Note/Recertification  []  See Discharge Summary    PLAN  [x]  Continue plan of care  [x]  Upgrade activities as tolerated  []  Discharge due to :  []  Other:      Lily Vasquez, PT, DPT       2/10/2025       8:31 AM

## 2025-02-12 ENCOUNTER — HOSPITAL ENCOUNTER (OUTPATIENT)
Facility: HOSPITAL | Age: 60
Setting detail: RECURRING SERIES
Discharge: HOME | End: 2025-02-15
Payer: COMMERCIAL

## 2025-02-12 PROCEDURE — 97016 VASOPNEUMATIC DEVICE THERAPY: CPT

## 2025-02-12 PROCEDURE — 97110 THERAPEUTIC EXERCISES: CPT

## 2025-02-12 NOTE — PROGRESS NOTES
PHYSICAL THERAPY - DAILY TREATMENT NOTE (updated 3/23)    Date of Service: 2025        Patient Name:  Robbie Bustamante :  1965   Medical   Diagnosis:  Complete rotator cuff tear or rupture of right shoulder, not specified as traumatic [M75.121]  Impingement syndrome of right shoulder [M75.41] Treatment Diagnosis:  M25.511  RIGHT SHOULDER PAIN    Referral Source:  Heladio Suazo MD Insurance:   Payor: [facility] / Plan: [facility] / Product Type: *No Product type* /     [x] Patient's date of birth verified                Visit #   Current  / Total 5 10   Time   In / Out 0830 0912   Total Treatment Time (in minutes) 42    Total Timed Codes  (in minutes) 32    [facility] Totals Reminder:    8-22 min = 1 unit; 23-37 min = 2 units; 38-52 min = 3 units;  53-67 min = 4 units; 68-82 min = 5 units    SUBJECTIVE  Pain Level (0-10 scale): 0/10    Any medication changes, allergies to medications, adverse drug reactions, diagnosis change, or new procedure performed?: No  Medications: [x]  Verified on Patient Summary List    Subjective functional status/changes:     \"It's not hurting today.\"    OBJECTIVE  Therapeutic Procedures:  Tx Min Billable or 1:1 Min (if diff from Tx Min) Procedure, Rationale, Specifics   32  38095 Therapeutic Exercise (timed):  increase ROM, strength, coordination, balance, and proprioception to improve patient's ability to progress to PLOF and address remaining functional goals. (see flow sheet as applicable)   32     Total Total   [x] Patient Education billed concurrently with other procedures    Modalities Rationale:     decrease edema, decrease inflammation, and decrease pain to improve patient's ability to progress to PLOF and address remaining functional goals.       min [] Estim Unattended,             []  NMES  [] PreMod       []  IFC  [] Other:  []w/US   []w/ice   []w/heat  Position:  Location:            min []  Mechanical Traction,        []  Cervical      []  Lumbar        []  Prone

## 2025-02-17 ENCOUNTER — HOSPITAL ENCOUNTER (OUTPATIENT)
Facility: HOSPITAL | Age: 60
Setting detail: RECURRING SERIES
Discharge: HOME | End: 2025-02-20
Payer: COMMERCIAL

## 2025-02-17 PROCEDURE — 97110 THERAPEUTIC EXERCISES: CPT

## 2025-02-17 NOTE — PROGRESS NOTES
PHYSICAL THERAPY - DAILY TREATMENT NOTE (updated 3/23)    Date of Service: 2025        Patient Name:  Robbie Bustamante :  1965   Medical   Diagnosis:  Complete rotator cuff tear or rupture of right shoulder, not specified as traumatic [M75.121]  Impingement syndrome of right shoulder [M75.41] Treatment Diagnosis:  M25.511  RIGHT SHOULDER PAIN    Referral Source:  Heladio Suazo MD Insurance:   [facility]or[facility] / [facility] / Product Type: *No Product type* /     [x] Patient's date of birth verified                Visit #   Current  / Total 6 10   Time   In / Out 801 am 858 am   Total Treatment Time (in minutes) 57    Total Timed Codes  (in minutes) 43    [facility] Totals Reminder:    8-22 min = 1 unit; 23-37 min = 2 units; 38-52 min = 3 units;  53-67 min = 4 units; 68-82 min = 5 units      SUBJECTIVE  Pain Level (0-10 scale): 0/10    Any medication changes, allergies to medications, adverse drug reactions, diagnosis change, or new procedure performed?: No  Medications: [x]  Verified on Patient Summary List    Subjective functional status/changes:     \"Pt reports no issues since last visit. Still wearing sling.\"    OBJECTIVE  Therapeutic Procedures:  Tx Min Billable or 1:1 Min (if diff from Tx Min) Procedure, Rationale, Specifics   43 43 35124 Therapeutic Exercise (timed):  increase ROM, strength, coordination, balance, and proprioception to improve patient's ability to progress to PLOF and address remaining functional goals. (see flow sheet as applicable)        43 43    Total Total   [x] Patient Education billed concurrently with other procedures    Modalities Rationale:     decrease edema, decrease inflammation, decrease pain, and increase tissue extensibility to improve patient's ability to progress to PLOF and address remaining functional goals.       min [] Estim Unattended,             []  NMES  [] PreMod       []  IFC  [] Other:  []w/US   []w/ice   []w/heat  Position:  Location:            min

## 2025-02-19 ENCOUNTER — HOSPITAL ENCOUNTER (OUTPATIENT)
Facility: HOSPITAL | Age: 60
Setting detail: RECURRING SERIES
Discharge: HOME | End: 2025-02-22
Payer: COMMERCIAL

## 2025-02-19 PROCEDURE — 97016 VASOPNEUMATIC DEVICE THERAPY: CPT

## 2025-02-19 PROCEDURE — 97110 THERAPEUTIC EXERCISES: CPT

## 2025-02-19 NOTE — PROGRESS NOTES
PHYSICAL THERAPY - DAILY TREATMENT NOTE (updated 3/23)    Date of Service: 2025        Patient Name:  Robbie Bustamante :  1965   Medical   Diagnosis:  Complete rotator cuff tear or rupture of right shoulder, not specified as traumatic [M75.121]  Impingement syndrome of right shoulder [M75.41] Treatment Diagnosis:  M25.511  RIGHT SHOULDER PAIN    Referral Source:  Heladio Suazo MD Insurance:   Payor: [facility] / Plan: [facility] / Product Type: *No Product type* /     [x] Patient's date of birth verified                Visit #   Current  / Total 7 10   Time   In / Out 0754 7917   Total Treatment Time (in minutes) 43    Total Timed Codes  (in minutes) 33    [facility] Totals Reminder:    8-22 min = 1 unit; 23-37 min = 2 units; 38-52 min = 3 units;  53-67 min = 4 units; 68-82 min = 5 units    SUBJECTIVE  Pain Level (0-10 scale): 1/10    Any medication changes, allergies to medications, adverse drug reactions, diagnosis change, or new procedure performed?: No  Medications: [x]  Verified on Patient Summary List    Subjective functional status/changes:     \"About a 1.\"    OBJECTIVE  Therapeutic Procedures:  Tx Min Billable or 1:1 Min (if diff from Tx Min) Procedure, Rationale, Specifics   33  62907 Therapeutic Exercise (timed):  increase ROM, strength, coordination, balance, and proprioception to improve patient's ability to progress to PLOF and address remaining functional goals. (see flow sheet as applicable)   33     Total Total   [x] Patient Education billed concurrently with other procedures    Modalities Rationale:     decrease edema, decrease inflammation, and decrease pain to improve patient's ability to progress to PLOF and address remaining functional goals.       min [] Estim Unattended,             []  NMES  [] PreMod       []  IFC  [] Other:  []w/US   []w/ice   []w/heat  Position:  Location:            min []  Mechanical Traction,        []  Cervical      []  Lumbar        []  Prone         []  Supine

## 2025-02-24 ENCOUNTER — HOSPITAL ENCOUNTER (OUTPATIENT)
Facility: HOSPITAL | Age: 60
Setting detail: RECURRING SERIES
Discharge: HOME | End: 2025-02-27
Payer: COMMERCIAL

## 2025-02-24 PROCEDURE — 97110 THERAPEUTIC EXERCISES: CPT

## 2025-02-24 NOTE — PROGRESS NOTES
PHYSICAL THERAPY - DAILY TREATMENT NOTE (updated 3/23)    Date of Service: 2025        Patient Name:  Robbie Bustamante :  1965   Medical   Diagnosis:  Complete rotator cuff tear or rupture of right shoulder, not specified as traumatic [M75.121]  Impingement syndrome of right shoulder [M75.41] Treatment Diagnosis:  M25.511  RIGHT SHOULDER PAIN    Referral Source:  Heladio Suazo MD Insurance:   Payor: [facility] / Plan: [facility] / Product Type: *No Product type* /     [x] Patient's date of birth verified                Visit #   Current  / Total 8 10   Time   In / Out 0802 0845   Total Treatment Time (in minutes) 43    Total Timed Codes  (in minutes) 43    [facility] Totals Reminder:    8-22 min = 1 unit; 23-37 min = 2 units; 38-52 min = 3 units;  53-67 min = 4 units; 68-82 min = 5 units    SUBJECTIVE  Pain Level (0-10 scale): 0/10    Any medication changes, allergies to medications, adverse drug reactions, diagnosis change, or new procedure performed?: No  Medications: [x]  Verified on Patient Summary List    Subjective functional status/changes:     \"I am doing good.\"    OBJECTIVE  Therapeutic Procedures:  Tx Min Billable or 1:1 Min (if diff from Tx Min) Procedure, Rationale, Specifics   43  85447 Therapeutic Exercise (timed):  increase ROM, strength, coordination, balance, and proprioception to improve patient's ability to progress to PLOF and address remaining functional goals. (see flow sheet as applicable)   43     Total Total   [x] Patient Education billed concurrently with other procedures    Pain Level at end of session (0-10 scale): 0/10    Assessment   Patient tolerated treatment well today. We progressed with use of pulleys for more PROM which he did very well with. No ice needed at the end of the session. Patient will continue to benefit from skilled PT services to modify and progress therapeutic interventions, analyze and address functional mobility deficits, analyze and address ROM deficits,

## 2025-02-26 ENCOUNTER — HOSPITAL ENCOUNTER (OUTPATIENT)
Facility: HOSPITAL | Age: 60
Setting detail: RECURRING SERIES
Discharge: HOME | End: 2025-03-01
Payer: COMMERCIAL

## 2025-02-26 PROCEDURE — 97110 THERAPEUTIC EXERCISES: CPT

## 2025-02-26 NOTE — PROGRESS NOTES
PHYSICAL THERAPY - DAILY TREATMENT NOTE (updated 3/23)    Date of Service: 2025        Patient Name:  Robbie Bustamante :  1965   Medical   Diagnosis:  Complete rotator cuff tear or rupture of right shoulder, not specified as traumatic [M75.121]  Impingement syndrome of right shoulder [M75.41] Treatment Diagnosis:  M25.511  RIGHT SHOULDER PAIN    Referral Source:  Heladio Suazo MD Insurance:   Payor: [facility] / Plan: [facility] / Product Type: *No Product type* /     [x] Patient's date of birth verified                Visit #   Current  / Total 9 10   Time   In / Out 755  am 845 am   Total Treatment Time (in minutes) 50    Total Timed Codes  (in minutes) 40    [facility] Totals Reminder:    8-22 min = 1 unit; 23-37 min = 2 units; 38-52 min = 3 units;  53-67 min = 4 units; 68-82 min = 5 units      SUBJECTIVE  Pain Level (0-10 scale): 0/10    Any medication changes, allergies to medications, adverse drug reactions, diagnosis change, or new procedure performed?: No  Medications: [x]  Verified on Patient Summary List    Subjective functional status/changes:     \"Pt reports no issue since last session and he had a follow up with MD on Monday.\"    OBJECTIVE  Therapeutic Procedures:  Tx Min Billable or 1:1 Min (if diff from Tx Min) Procedure, Rationale, Specifics   35 35 [id number] Therapeutic Exercise (timed):  increase ROM, strength, coordination, balance, and proprioception to improve patient's ability to progress to PLOF and address remaining functional goals. (see flow sheet as applicable)        35 35    Total Total   [x] Patient Education billed concurrently with other procedures    Modalities Rationale:     decrease edema, decrease inflammation, decrease pain, and increase tissue extensibility to improve patient's ability to progress to PLOF and address remaining functional goals.       min [] Estim Unattended,             []  NMES  [] PreMod       []  IFC  [] Other:  []w/US   []w/ice

## 2025-03-03 ENCOUNTER — HOSPITAL ENCOUNTER (OUTPATIENT)
Facility: HOSPITAL | Age: 60
Setting detail: RECURRING SERIES
Discharge: HOME | End: 2025-03-06
Payer: COMMERCIAL

## 2025-03-03 PROCEDURE — 97110 THERAPEUTIC EXERCISES: CPT

## 2025-03-03 NOTE — PROGRESS NOTES
[facility]  [address] [place] [id number]  Ph: [phone]     Fax: [phone]    PHYSICAL THERAPY PROGRESS NOTE  Patient Name:  Robbie Bustamante :  1965   Treatment/Medical Diagnosis: Complete rotator cuff tear or rupture of right shoulder, not specified as traumatic [M75.121]  Impingement syndrome of right shoulder [M75.41]   Referral Source:  Heladoi Suazo MD     Date of Initial Visit:  2025 Attended Visits:  10 Missed Visits:  0       Summary of Care / Assessment / Recommendations:   Patient tolerated treatment well so far working on gaining ROM in the shoulder while adhering to the protocol progressions. He has good PROM at this time and no pain at end ranges. He did well with progressions of AAROM today and will return to MD for follow up today for 6 week follow up. We will updated HEP at next visit to add in AAROM exercises we are transitioning to. We will updated POC to continue progressions within protocol with AAROM over next 2 weeks before transitioning to AROM at week 8 post op. Patient will continue to benefit from skilled PT services to modify and progress therapeutic interventions, analyze and address functional mobility deficits, analyze and address ROM deficits, and analyze and address strength deficits to address functional deficits and attain remaining goals.      Progress Toward Goals:   Short Term Goals, to be met within 5 treatments:  Patient will demonstrate independence and compliance with HEP in order to increase mobility and assist with carryover from PT services.  Status at last Eval/Progress Note: provided  Current Status: no issues with them  Goal Met?  Yes     2.  Patient will be able to reach overhead without pain greater than or equal to 4/10 to increase functional mobility.  Status at last Eval/Progress Note: unable  Current Status: not cleared yet  Goal Met?  No     3. Patient will be able to ibrahima/doff shirt without 
therapeutic interventions, analyze and address functional mobility deficits, analyze and address ROM deficits, and analyze and address strength deficits to address functional deficits and attain remaining goals.    Progress toward goals / Updated goals:    PRECAUTIONS:  See RTC repair and decompression protocols in chart   Sx date: 1/17/25        Date of Initial Evaluation: 1/27/2025  Date of last Progress Note: 03/03/2025  Visit # of last Progress Note: 10      Number of Insurance Authorized visits: 11 visits until 4/26/25  *Not authorized for use ES, vaso*      Therapeutic Exercise Flow Sheet:                                                                                    Running Visit #    EXERCISE   1   2   3   4   5   6   7   8   9   10      Table slides x 3    X20 ea     x 4'     X 4'     X 4'      x4'ea     x4'ea    x4'ea     x4'ea    x4'ea X3 min ea       Shoulder rolls    x20    X 20      X 20      X 20   bkwd  x20 bkwd  x20  bkwd  x20  X30 ea forward/backward   X20 ea forward/backward  X20 ea forward/backward      Scapular squeeze    x20    x 20 /5\" hold         x 20 /5\" hold      x 20 /5\" hold     5\"x20  5\"x20        5\"x20  5\"x20     5\"x20   5\"x20        PROM           x 10'        X 12'     X 12'     x8'  X 10'     x8'  x8'  x8'      UT str         No UE pull   3/20\"       Pendulums                   fwd/bkwd/  Lat  x20ea X 2' multiple direction   fwd/bkwd/  Lat  x20ea  fwd/bkwd/  Lat  x20ea   fwd/bkwd/  Lat  x20ea         bicep curls                      3 ways x 20 ea  3 ways  x20ea  3 ways  x20ea  Seated  3 ways  x20ea          Pulleys- F/ABd                      3 min ea     3 min ea   UBE           Lvl 1 x12 min   Wall Slides          Flex towel x20   Modalities to be included future treatment sessions: Vasopneumatic Compression  Has HEP been provided to patient? [] No    [x] Yes        Access Code: [email or web address]        Date Provided: 01/27/2025  [] Reviewed HEP    [] Progressed/Changed HEP, details:

## 2025-03-05 ENCOUNTER — HOSPITAL ENCOUNTER (OUTPATIENT)
Facility: HOSPITAL | Age: 60
Setting detail: RECURRING SERIES
Discharge: HOME | End: 2025-03-08
Payer: COMMERCIAL

## 2025-03-05 PROCEDURE — 97110 THERAPEUTIC EXERCISES: CPT

## 2025-03-05 NOTE — PROGRESS NOTES
str                  Pendulums                            bicep curls                               Pulleys- F/ABd     3'  ea                      UBE  Lvl 1 x12 min                     Wall Slides Flex towel x20                     Modalities to be included future treatment sessions: Vasopneumatic Compression  Has HEP been provided to patient? [] No    [x] Yes        Access Code: [email or web address]        Date Provided: 01/27/2025  [] Reviewed HEP    [] Progressed/Changed HEP, details:      GOALS  Short Term Goals, to be met within 5 treatments:  Patient will demonstrate independence and compliance with HEP in order to increase mobility and assist with carryover from PT services.  Status at last Eval/Progress Note: provided  Current Status: no issues with them  Goal Met?  Yes     2.  Patient will be able to reach overhead without pain greater than or equal to 4/10 to increase functional mobility.  Status at last Eval/Progress Note: unable  Current Status: not cleared yet  Goal Met?  No     3. Patient will be able to ibrahima/doff shirt without pain greater than or equal to 1/10 to increase functional mobility.  Status at last Eval/Progress Note: difficulty; no arom  Current Status:still wearing button up  Goal Met?  In Progress     4. Patient will increase right shoulder prom flexion to 120 degrees in order to improve function.  Status at last Eval/Progress Note: 70 degrees  Current Status: 160 degrees  Goal Met?  Yes     Long Term Goals, to be met within 10 treatments:  1.Patient will increase right shoulder prom abduction to 150 degrees in order to improve function.  Status at last Eval/Progress Note: 120 degrees  Current Status: 150 degrees  Goal Met?  Yes     2.  Patient will increase right shoulder flexion mmt to 4+/5 in order to improve function.  Status at last Eval/Progress Note: 3-/5  Current Status:  3/5  Goal Met?  In Progress     3. Patient will increase right shoulder abduction mmt to 4+/5 in order to improve

## 2025-03-10 ENCOUNTER — HOSPITAL ENCOUNTER (OUTPATIENT)
Facility: HOSPITAL | Age: 60
Setting detail: RECURRING SERIES
Discharge: HOME | End: 2025-03-13
Payer: COMMERCIAL

## 2025-03-10 PROCEDURE — 97110 THERAPEUTIC EXERCISES: CPT

## 2025-03-10 NOTE — PROGRESS NOTES
PHYSICAL THERAPY - DAILY TREATMENT NOTE (updated 3/23)    Date of Service: 3/10/2025        Patient Name:  Robbie Bustamante :  1965   Medical   Diagnosis:  Complete rotator cuff tear or rupture of right shoulder, not specified as traumatic [M75.121]  Impingement syndrome of right shoulder [M75.41] Treatment Diagnosis:  M25.511  RIGHT SHOULDER PAIN    Referral Source:  Heladio Suazo MD Insurance:   [place]or: [facility] / Plan: [facility] / Product Type: *No Product type* /     [x] Patient's date of birth verified                Visit #   Current  / Total 12 20   Time   In / Out 0805 0961   Total Treatment Time (in minutes) 56   Total Timed Codes  (in minutes) 56   [facility] Totals Reminder:    8-22 min = 1 unit; 23-37 min = 2 units; 38-52 min = 3 units;  53-67 min = 4 units; 68-82 min = 5 units      SUBJECTIVE  Pain Level (0-10 scale): 2/10    Any medication changes, allergies to medications, adverse drug reactions, diagnosis change, or new procedure performed?: No  Medications: [x]  Verified on Patient Summary List    Subjective functional status/changes:     \"I started back work and am a little sore.\"    OBJECTIVE  Therapeutic Procedures:  Tx Min Billable or 1:1 Min (if diff from Tx Min) Procedure, Rationale, Specifics   56  [id number] Therapeutic Exercise (timed):  increase ROM, strength, coordination, balance, and proprioception to improve patient's ability to progress to PLOF and address remaining functional goals. (see flow sheet as applicable)                  56     Total Total   [x] Patient Education billed concurrently with other procedures    Modalities Rationale:     decrease inflammation and decrease pain to improve patient's ability to progress to PLOF and address remaining functional goals.       min [] Estim Unattended,             []  NMES  [] PreMod       []  IFC  [] Other:  []w/US   []w/ice   []w/heat  Position:  Location:            min []  Mechanical Traction,        []  Cervical      []  Lumbar

## 2025-03-13 ENCOUNTER — HOSPITAL ENCOUNTER (OUTPATIENT)
Facility: HOSPITAL | Age: 60
Setting detail: RECURRING SERIES
Discharge: HOME | End: 2025-03-16
Payer: COMMERCIAL

## 2025-03-13 PROCEDURE — 97110 THERAPEUTIC EXERCISES: CPT

## 2025-03-13 NOTE — PROGRESS NOTES
not cleared yet  Goal Met?  No     3. Patient will be able to ibrahima/doff shirt without pain greater than or equal to 1/10 to increase functional mobility.  Status at last Eval/Progress Note: difficulty; no arom  Current Status:still wearing button up  Goal Met?  In Progress     4. Patient will increase right shoulder prom flexion to 120 degrees in order to improve function.  Status at last Eval/Progress Note: 70 degrees  Current Status: 160 degrees  Goal Met?  Yes     Long Term Goals, to be met within 10 treatments:  1.Patient will increase right shoulder prom abduction to 150 degrees in order to improve function.  Status at last Eval/Progress Note: 120 degrees  Current Status: 150 degrees  Goal Met?  Yes     2.  Patient will increase right shoulder flexion mmt to 4+/5 in order to improve function.  Status at last Eval/Progress Note: 3-/5  Current Status:  3/5  Goal Met?  In Progress     3. Patient will increase right shoulder abduction mmt to 4+/5 in order to improve function.  Status at last Eval/Progress Note: 3-/5  Current Status: 3/5  Goal Met?  In Progress    []  See Progress Note/Recertification  []  See Discharge Summary    PLAN  [x]  Continue plan of care  [x]  Upgrade activities as tolerated  []  Discharge due to :  []  Other:      Brenda Drummond PTA, TERESA       3/13/2025       8:26 AM

## 2025-03-17 ENCOUNTER — HOSPITAL ENCOUNTER (OUTPATIENT)
Facility: HOSPITAL | Age: 60
Setting detail: RECURRING SERIES
Discharge: HOME | End: 2025-03-20
Payer: COMMERCIAL

## 2025-03-17 PROCEDURE — 97110 THERAPEUTIC EXERCISES: CPT

## 2025-03-17 NOTE — PROGRESS NOTES
PHYSICAL THERAPY - DAILY TREATMENT NOTE (updated 3/23)    Date of Service: 3/17/2025        Patient Name:  Robbie Bustamante :  1965   Medical   Diagnosis:  Complete rotator cuff tear or rupture of right shoulder, not specified as traumatic [M75.121]  Impingement syndrome of right shoulder [M75.41] Treatment Diagnosis:  M25.511  RIGHT SHOULDER PAIN    Referral Source:  Heladio Suazo MD Insurance:   [facility]or: [facility] / [facility] / Product Type: *No Product type* /     [x] Patient's date of birth verified                Visit #   Current  / Total 14 16   Time   In / Out 728 am 827 am   Total Treatment Time (in minutes) 59    Total Timed Codes  (in minutes) 59    [facility] Totals Reminder:    8-22 min = 1 unit; 23-37 min = 2 units; 38-52 min = 3 units;  53-67 min = 4 units; 68-82 min = 5 units      SUBJECTIVE  Pain Level (0-10 scale): 1/10    Any medication changes, allergies to medications, adverse drug reactions, diagnosis change, or new procedure performed?: No  Medications: [x]  Verified on Patient Summary List    Subjective functional status/changes:     \"Pt reports that he no issues at home an ddoing fine with current ex..\"    OBJECTIVE  Therapeutic Procedures:  Tx Min Billable or 1:1 Min (if diff from Tx Min) Procedure, Rationale, Specifics   59 59 39613 Therapeutic Exercise (timed):  increase ROM, strength, coordination, balance, and proprioception to improve patient's ability to progress to PLOF and address remaining functional goals. (see flow sheet as applicable)        59 59    Total Total   [x] Patient Education billed concurrently with other procedures    Modalities Rationale:    declined use    Pain Level at end of session (0-10 scale): 0/10    Assessment   Patient tolerated treatment working on functional shoulder motion within protocol parameters. Pt still limited in ex  to PROM and AAROM. Pt doing well with all activities and only needing a few cues to perform more scapular motion and less

## 2025-03-19 ENCOUNTER — HOSPITAL ENCOUNTER (OUTPATIENT)
Facility: HOSPITAL | Age: 60
Setting detail: RECURRING SERIES
Discharge: HOME | End: 2025-03-22
Payer: COMMERCIAL

## 2025-03-19 PROCEDURE — 97110 THERAPEUTIC EXERCISES: CPT

## 2025-03-24 ENCOUNTER — HOSPITAL ENCOUNTER (OUTPATIENT)
Facility: HOSPITAL | Age: 60
Setting detail: RECURRING SERIES
Discharge: HOME | End: 2025-03-27
Payer: COMMERCIAL

## 2025-03-24 PROCEDURE — 97110 THERAPEUTIC EXERCISES: CPT

## 2025-03-24 NOTE — PROGRESS NOTES
[facility]  [address] [place] [address]  Ph: [phone]     Fax: [phone]    PHYSICAL THERAPY PROGRESS NOTE  Patient Name:  Robbie Bustamante :  1965   Treatment/Medical Diagnosis: Complete rotator cuff tear or rupture of right shoulder, not specified as traumatic [M75.121]  Impingement syndrome of right shoulder [M75.41]   Referral Source:  Heladio Suazo MD     Date of Initial Visit:  2025 Attended Visits:  16 Missed Visits:  0       Summary of Care / Assessment / Recommendations:   Patient tolerated treatment well so far over past 1 visits post RTC repair. He is demonstrating proper progressions in strength and ROM per healing time lines. He is about 9 weeks post op at this point and demonstrating good ROM. He still has most weakness in abduction and flexion of the shoulder which is to be expected at this point in time. 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 He was encouraged to continue with his HEP at this time and we will slowly continue progressions in clinic to gain more strength. Patient will continue to benefit from skilled PT services to modify and progress therapeutic interventions, analyze and address functional mobility deficits, analyze and address ROM deficits, analyze and address strength deficits, and analyze and address soft tissue restrictions to address functional deficits and attain remaining goals.      Progress Toward Goals:   Short Term Goals, to be met within 5 treatments:  Patient will demonstrate independence and compliance with HEP in order to increase mobility and assist with carryover from PT services.  Status at last Eval/Progress Note: provided  Current Status: completing without irritation or problems  Goal Met?  Yes     2.  Patient will be able to reach overhead without pain greater than 
strength. Patient will continue to benefit from skilled PT services to modify and progress therapeutic interventions, analyze and address functional mobility deficits, analyze and address ROM deficits, analyze and address strength deficits, and analyze and address soft tissue restrictions to address functional deficits and attain remaining goals.    Progress toward goals / Updated goals:    PRECAUTIONS:  See RTC repair and decompression protocols in chart   Sx date: 1/17/25        Date of Initial Evaluation: 1/27/2025  Date of last Progress Note: 03/24/2025  Visit # of last Progress Note: 16      Number of Insurance Authorized visits: 16 visits total until 5/3/25   INSURANCE DOES NOT COVER ES AND VASO      Therapeutic Exercise Flow Sheet:                                                                                    Running Visit #    EXERCISE auth  11   12   13   14   15 PN  16   17   18   19   20      Table slides x 3     X 4 '       X 4 with MHP                  Shoulder rolls    x20    x20                      Scapular squeeze    x20    x20  red  X20  + ext YTB 5\"/ 20 + ext   YTB 5\"/ 20 + ext   RTB x20 + ext               Isometrics           3 way 10/10\"   3 way 10/10\"     5 way 10x10 sec           UT str                          Pendulums                   between tasks as needed              finger ladder flx/abd              x10ea     x10ea  x 10 ea x 10 ea  x 10 ea                Pulleys- F/ABd     3'  ea        4'  ea    x3'ea x3'ea   x3'ea              UBE  Lvl 1 x12 min   Lvl 1 x12 min    Lvl 1 x12'  Fwd/bkwd  Lvl 1 x12'  Fwd/  bkwd  Lvl 1 x8'  Fwd/  bkwd Lvl 2 x12 min            Wall Slides Flex towel x20  Flex towel x20   Flex B/abd  x20ea  Flex x 3' towel  Flex x 3' towel    Flex /abd  x20ea               wand     Supine  Flex/glenn  x20ea Supine  Flex/abd  x20ea  Supine  Flex/abd  x20ea    brown x20 ea standing           ER/IR TB      RTB x20 ea       Modalities to be included future treatment

## 2025-04-09 ENCOUNTER — HOSPITAL ENCOUNTER (OUTPATIENT)
Facility: HOSPITAL | Age: 60
Setting detail: RECURRING SERIES
Discharge: HOME | End: 2025-04-12
Payer: COMMERCIAL

## 2025-04-09 PROCEDURE — 97110 THERAPEUTIC EXERCISES: CPT

## 2025-04-09 NOTE — PROGRESS NOTES
thera band for resistance as well. Pt did note some ache in shoulder with more active reaching timed task however this eased with pulleys to cool down. Pt declined use of ice and notes he will using later . Note also discussed scar massage and mobility to star at home to ease TTT of anterior incision site.   Patient will continue to benefit from skilled PT services to modify and progress therapeutic interventions, analyze and address functional mobility deficits, analyze and address ROM deficits, analyze and address strength deficits, and analyze and address soft tissue restrictions to address functional deficits and attain remaining goals.    Progress toward goals / Updated goals:    PRECAUTIONS:  See RTC repair and decompression protocols in chart   Sx date: 1/17/25        Date of Initial Evaluation: 1/27/2025  Date of last Progress Note: 03/24/2025  Visit # of last Progress Note: 16      Number of Insurance Authorized visits: 22 visits total until 5/22/25   INSURANCE DOES NOT COVER ES AND VASO      Therapeutic Exercise Flow Sheet:                                                                                    Running Visit #    EXERCISE auth  11   12   13   14   15 PN  16   17   18   19   20      Table slides x 3     X 4 '       X 4 with MHP        --            Shoulder rolls    x20    x20                      Scapular squeeze    x20    x20  red  X20  + ext YTB 5\"/ 20 + ext   YTB 5\"/ 20 + ext   RTB x20 + ext  RTB x20 + ext          Bilat ER        Against doors Ytb x 20          Isometrics           3 way 10/10\"   3 way 10/10\"     5 way 10x10 sec HEP          Shelf lifts             1# db flex 3' bottom shelf,  Cones abd x 3'             Pendulums                   between tasks as needed    between shelf lift          finger ladder flx/abd              x10ea     x10ea  x 10 ea x 10 ea  x 10 ea      --            Pulleys- F/ABd     3'  ea        4'  ea    x3'ea x3'ea   x3'ea     3' ea + IR  (cool down)

## 2025-04-14 ENCOUNTER — HOSPITAL ENCOUNTER (OUTPATIENT)
Facility: HOSPITAL | Age: 60
Setting detail: RECURRING SERIES
Discharge: HOME | End: 2025-04-17
Payer: COMMERCIAL

## 2025-04-14 PROCEDURE — 97110 THERAPEUTIC EXERCISES: CPT

## 2025-04-14 NOTE — PROGRESS NOTES
PHYSICAL THERAPY - DAILY TREATMENT NOTE (updated 3/23)    Date of Service: 2025        Patient Name:  Robbie Bustamante :  1965   Medical   Diagnosis:  Complete rotator cuff tear or rupture of right shoulder, not specified as traumatic [M75.121]  Impingement syndrome of right shoulder [M75.41] Treatment Diagnosis:  M25.511  RIGHT SHOULDER PAIN    Referral Source:  Heladio Suazo MD Insurance:   Payor: [facility] / Plan: [facility] / Product Type: *No Product type* /     [x] Patient's date of birth verified                Visit #   Current  / Total 18    Time   In / Out 8155 3647   Total Treatment Time (in minutes) 53    Total Timed Codes  (in minutes) 53    [facility] Totals Reminder:    8-22 min = 1 unit; 23-37 min = 2 units; 38-52 min = 3 units;  53-67 min = 4 units; 68-82 min = 5 units      SUBJECTIVE  Pain Level (0-10 scale): 0/10    Any medication changes, allergies to medications, adverse drug reactions, diagnosis change, or new procedure performed?: No  Medications: [x]  Verified on Patient Summary List    Subjective functional status/changes:     \"I go back to the doctor today. I do have some pain in my shoulder.\"    OBJECTIVE  Therapeutic Procedures:  Tx Min Billable or 1:1 Min (if diff from Tx Min) Procedure, Rationale, Specifics   53 53 [id number] Therapeutic Exercise (timed):  increase ROM, strength, coordination, balance, and proprioception to improve patient's ability to progress to PLOF and address remaining functional goals. (see flow sheet as applicable)   53 53    Total Total   [x] Patient Education billed concurrently with other procedures      Pain Level at end of session (0-10 scale): 0/10    Assessment   Patient tolerated treatment very well today working on strength and endurance of the shoulder. Most restrictions and irritation noted with ER and abduction of the shoulder which is to be expected. He follows up with MD today which should be a good report comparing to progress in clinic.

## 2025-04-16 ENCOUNTER — HOSPITAL ENCOUNTER (OUTPATIENT)
Facility: HOSPITAL | Age: 60
Setting detail: RECURRING SERIES
Discharge: HOME | End: 2025-04-19
Payer: COMMERCIAL

## 2025-04-16 PROCEDURE — 97110 THERAPEUTIC EXERCISES: CPT

## 2025-04-16 NOTE — PROGRESS NOTES
PHYSICAL THERAPY - DAILY TREATMENT NOTE (updated 3/23)    Date of Service: 2025        Patient Name:  Robbie Bustamante :  1965   Medical   Diagnosis:  Complete rotator cuff tear or rupture of right shoulder, not specified as traumatic [M75.121]  Impingement syndrome of right shoulder [M75.41] Treatment Diagnosis:  M25.511  RIGHT SHOULDER PAIN    Referral Source:  Heladio Suazo MD Insurance:   Payor: [facility] / Plan: [facility] / Product Type: *No Product type* /     [x] Patient's date of birth verified                Visit #   Current  / Total    Time   In / Out 722 0815   Total Treatment Time (in minutes) 53    Total Timed Codes  (in minutes) 53    [facility] Totals Reminder:    8-22 min = 1 unit; 23-37 min = 2 units; 38-52 min = 3 units;  53-67 min = 4 units; 68-82 min = 5 units      SUBJECTIVE  Pain Level (0-10 scale): 2/10    Any medication changes, allergies to medications, adverse drug reactions, diagnosis change, or new procedure performed?: No  Medications: [x]  Verified on Patient Summary List    Subjective functional status/changes:     \"I was told that I could lift up to 25# now by MD.\"    OBJECTIVE  Therapeutic Procedures:  Tx Min Billable or 1:1 Min (if diff from Tx Min) Procedure, Rationale, Specifics   53 53 [id number] Therapeutic Exercise (timed):  increase ROM, strength, coordination, balance, and proprioception to improve patient's ability to progress to PLOF and address remaining functional goals. (see flow sheet as applicable)   53 53    Total Total   [x] Patient Education billed concurrently with other procedures      Pain Level at end of session (0-10 scale): 0/10    Assessment   Patient tolerated treatment very well today. He did have some irritation with DB lifts for IYT today, but it resolved with pulleys for cool down today . Patient will continue to benefit from skilled PT services to modify and progress therapeutic interventions, analyze and address functional mobility deficits,

## 2025-04-21 ENCOUNTER — HOSPITAL ENCOUNTER (OUTPATIENT)
Facility: HOSPITAL | Age: 60
Setting detail: RECURRING SERIES
Discharge: HOME | End: 2025-04-24
Payer: COMMERCIAL

## 2025-04-21 PROCEDURE — 97110 THERAPEUTIC EXERCISES: CPT

## 2025-04-21 NOTE — PROGRESS NOTES
PHYSICAL THERAPY - DAILY TREATMENT NOTE (updated 3/23)    Date of Service: 2025        Patient Name:  Robbie Bustamante :  1965   Medical   Diagnosis:  Complete rotator cuff tear or rupture of right shoulder, not specified as traumatic [M75.121]  Impingement syndrome of right shoulder [M75.41] Treatment Diagnosis:  M25.511  RIGHT SHOULDER PAIN    Referral Source:  Heladio Suazo MD Insurance:   [facility]or[facility] / Product Type: *No Product type* /     [x] Patient's date of birth verified                Visit #   Current  / Total    Time   In / Out 4087 9272   Total Treatment Time (in minutes) 53    Total Timed Codes  (in minutes) 53    [facility] Totals Reminder:    8-22 min = 1 unit; 23-37 min = 2 units; 38-52 min = 3 units;  53-67 min = 4 units; 68-82 min = 5 units      SUBJECTIVE  Pain Level (0-10 scale): 2/10    Any medication changes, allergies to medications, adverse drug reactions, diagnosis change, or new procedure performed?: No  Medications: [x]  Verified on Patient Summary List    Subjective functional status/changes:     \"I am a little sore this morning.\"    OBJECTIVE  Therapeutic Procedures:  Tx Min Billable or 1:1 Min (if diff from Tx Min) Procedure, Rationale, Specifics   53 53 [id number] Therapeutic Exercise (timed):  increase ROM, strength, coordination, balance, and proprioception to improve patient's ability to progress to PLOF and address remaining functional goals. (see flow sheet as applicable)   53 53    Total Total   [x] Patient Education billed concurrently with other procedures      Pain Level at end of session (0-10 scale): 0/10    Assessment   Patient tolerated treatment fair today. He was a little more sore today even with some progressions. Due to reports of tightness, we added in some stretches which he found challenging for IR and ER. He was encouraged to add in some stretches in at home. Patient will continue to benefit from skilled PT services to modify and

## 2025-04-23 ENCOUNTER — HOSPITAL ENCOUNTER (OUTPATIENT)
Facility: HOSPITAL | Age: 60
Setting detail: RECURRING SERIES
Discharge: HOME | End: 2025-04-26
Payer: COMMERCIAL

## 2025-04-23 PROCEDURE — 97110 THERAPEUTIC EXERCISES: CPT

## 2025-04-23 NOTE — PROGRESS NOTES
PHYSICAL THERAPY - DAILY TREATMENT NOTE (updated 3/23)    Date of Service: 2025        Patient Name:  Robbie Bustamante :  1965   Medical   Diagnosis:  Complete rotator cuff tear or rupture of right shoulder, not specified as traumatic [M75.121]  Impingement syndrome of right shoulder [M75.41] Treatment Diagnosis:  M25.511  RIGHT SHOULDER PAIN    Referral Source:  Heladio Suazo MD Insurance:   [facility]or: [facility] / Plan: [facility] / Product Type: *No Product type* /     [x] Patient's date of birth verified                Visit #   Current  / Total 21 22   Time   In / Out 722 am 820 am   Total Treatment Time (in minutes) 58    Total Timed Codes  (in minutes) 58    [facility] Totals Reminder:    8-22 min = 1 unit; 23-37 min = 2 units; 38-52 min = 3 units;  53-67 min = 4 units; 68-82 min = 5 units      SUBJECTIVE  Pain Level (0-10 scale): 0/10    Any medication changes, allergies to medications, adverse drug reactions, diagnosis change, or new procedure performed?: No  Medications: [x]  Verified on Patient Summary List    Subjective functional status/changes:     \"Pt reports no issues. .\"    OBJECTIVE  Therapeutic Procedures:  Tx Min Billable or 1:1 Min (if diff from Tx Min) Procedure, Rationale, Specifics   58 58 [id number] Therapeutic Exercise (timed):  increase ROM, strength, coordination, balance, and proprioception to improve patient's ability to progress to PLOF and address remaining functional goals. (see flow sheet as applicable)        58 58    Total Total   [x] Patient Education billed concurrently with other procedures    Pain Level at end of session (0-10 scale): 0/10    Assessment   Patient tolerated treatment working on functional shoulder motion and strength. Pt increased activities to work on more reaching and open chain strengthening. Pt still elbow flexing when reaching up to grab items instead to true flexion to reach. Pt also very tight in his chest initiating ER door stretch since attempts to get

## 2025-04-28 ENCOUNTER — HOSPITAL ENCOUNTER (OUTPATIENT)
Facility: HOSPITAL | Age: 60
Setting detail: RECURRING SERIES
Discharge: HOME | End: 2025-05-01
Payer: COMMERCIAL

## 2025-04-28 PROCEDURE — 97110 THERAPEUTIC EXERCISES: CPT

## 2025-04-28 NOTE — PROGRESS NOTES
[facility]  [address] [place] [address]  Ph: [phone]     Fax: [phone]    PHYSICAL THERAPY PROGRESS NOTE  Patient Name:  Robbie Bustamante :  1965   Treatment/Medical Diagnosis: Complete rotator cuff tear or rupture of right shoulder, not specified as traumatic [M75.121]  Impingement syndrome of right shoulder [M75.41]   Referral Source:  Heladio Suazo MD     Date of Initial Visit:  2025 Attended Visits:  22 Missed Visits:  0       Summary of Care / Assessment / Recommendations:   Patient tolerated treatment working on functional shoulder motion and strength over the past 22 visits. We have progressed to strengthening and endurance tasks over past couple weeks per protocol. He is now 14 weeks post op and demonstrating fair progressions. He is mostly limited in abduction and IR ROM of the shoulder at this time. He has tightness in the chest region which is limited ROM above the head. He does need cues to maintain elbow extension with overhead motions to prevent compensatory patterns. He was encouraged to  continue with HEP and we will continue to focus on problem areas in the next round of therapy. Patient will continue to benefit from skilled PT services to modify and progress therapeutic interventions, analyze and address functional mobility deficits, analyze and address ROM deficits, analyze and address strength deficits, and analyze and address soft tissue restrictions to address functional deficits and attain remaining goals.      Progress Toward Goals:   Short Term Goals, to be met within 5 treatments:  Patient will demonstrate independence and compliance with HEP in order to increase mobility and assist with carryover from PT services.  Status at last Eval/Progress Note: provided  Current Status: completing without irritation or problems  Goal Met?  Yes     2.  Patient will be able to reach overhead without pain greater than or equal to

## 2025-04-28 NOTE — PROGRESS NOTES
PHYSICAL THERAPY - DAILY TREATMENT NOTE (updated 3/23)    Date of Service: 2025        Patient Name:  Robbie Bustamante :  1965   Medical   Diagnosis:  Complete rotator cuff tear or rupture of right shoulder, not specified as traumatic [M75.121]  Impingement syndrome of right shoulder [M75.41] Treatment Diagnosis:  M25.511  RIGHT SHOULDER PAIN    Referral Source:  Heladio Suazo MD Insurance:   Payor: [facility] / Plan: [facility] / Product Type: *No Product type* /     [x] Patient's date of birth verified                Visit #   Current  / Total    Time   In / Out 0717 3556   Total Treatment Time (in minutes) 66    Total Timed Codes  (in minutes) 66    [facility] Totals Reminder:    8-22 min = 1 unit; 23-37 min = 2 units; 38-52 min = 3 units;  53-67 min = 4 units; 68-82 min = 5 units      SUBJECTIVE  Pain Level (0-10 scale): 2/10    Any medication changes, allergies to medications, adverse drug reactions, diagnosis change, or new procedure performed?: No  Medications: [x]  Verified on Patient Summary List    Subjective functional status/changes:     \"I am a little sore where my shirt is rubbing my incision spot .\"    OBJECTIVE  Therapeutic Procedures:  Tx Min Billable or 1:1 Min (if diff from Tx Min) Procedure, Rationale, Specifics   66 66 [id number] Therapeutic Exercise (timed):  increase ROM, strength, coordination, balance, and proprioception to improve patient's ability to progress to PLOF and address remaining functional goals. (see flow sheet as applicable)        66 66    Total Total   [x] Patient Education billed concurrently with other procedures    Shoulder:   Strength AROM       Right Left Right Left     Flexion 4-/5 5/5  160 WFL     Extension 5/5 5/5  60 WFL     Abduction 4-/5 5/5  120 WFL     IR 5/5 5/5  60 or L4 WFL     ER 4-/5 5/5  85 WFL   Elbow:   Strength AROM       Right Left Right Left     Flexion 5/5 5/5 WFL WFL     Extension 5/5 5/5 WFL WFL       Pain Level at end of session (0-10

## 2025-05-12 ENCOUNTER — HOSPITAL ENCOUNTER (OUTPATIENT)
Facility: HOSPITAL | Age: 60
Setting detail: RECURRING SERIES
Discharge: HOME | End: 2025-05-15
Payer: COMMERCIAL

## 2025-05-12 PROCEDURE — 97110 THERAPEUTIC EXERCISES: CPT

## 2025-05-12 NOTE — PROGRESS NOTES
PHYSICAL THERAPY - DAILY TREATMENT NOTE (updated 3/23)    Date of Service: 2025        Patient Name:  Robbie Bustamante :  1965   Medical   Diagnosis:  Complete rotator cuff tear or rupture of right shoulder, not specified as traumatic [M75.121]  Impingement syndrome of right shoulder [M75.41] Treatment Diagnosis:  M25.511  RIGHT SHOULDER PAIN    Referral Source:  Heladio Suazo MD Insurance:   Payor: [facility] / Plan: [facility] / Product Type: *No Product type* /     [x] Patient's date of birth verified                Visit #   Current  / Total 23 28   Time   In / Out 0715 0805   Total Treatment Time (in minutes) 50    Total Timed Codes  (in minutes) 50    [facility] Totals Reminder:    8-22 min = 1 unit; 23-37 min = 2 units; 38-52 min = 3 units;  53-67 min = 4 units; 68-82 min = 5 units      SUBJECTIVE  Pain Level (0-10 scale): 2/10    Any medication changes, allergies to medications, adverse drug reactions, diagnosis change, or new procedure performed?: No  Medications: [x]  Verified on Patient Summary List    Subjective functional status/changes:     \"I am a little sore where my shirt is rubbing my incision spot .\"    OBJECTIVE  Therapeutic Procedures:  Tx Min Billable or 1:1 Min (if diff from Tx Min) Procedure, Rationale, Specifics   50 50 [id number] Therapeutic Exercise (timed):  increase ROM, strength, coordination, balance, and proprioception to improve patient's ability to progress to PLOF and address remaining functional goals. (see flow sheet as applicable)   50 50    Total Total   [x] Patient Education billed concurrently with other procedures    Pain Level at end of session (0-10 scale): 1/10    Assessment   Patient tolerated treatment well today. He has been working hard on overhead motion at home with stretches and use of his pulleys. Strength is improving in overhead motions as he was able to reach the top shelf with lifts today. Patient will continue to benefit from skilled PT services to modify

## 2025-05-14 ENCOUNTER — HOSPITAL ENCOUNTER (OUTPATIENT)
Facility: HOSPITAL | Age: 60
Setting detail: RECURRING SERIES
Discharge: HOME | End: 2025-05-17
Payer: COMMERCIAL

## 2025-05-14 PROCEDURE — 97110 THERAPEUTIC EXERCISES: CPT

## 2025-05-14 NOTE — PROGRESS NOTES
PHYSICAL THERAPY - DAILY TREATMENT NOTE (updated 3/23)    Date of Service: 2025        Patient Name:  Robbie Bustamante :  1965   Medical   Diagnosis:  Complete rotator cuff tear or rupture of right shoulder, not specified as traumatic [M75.121]  Impingement syndrome of right shoulder [M75.41] Treatment Diagnosis:  M25.511  RIGHT SHOULDER PAIN    Referral Source:  Heladio Suazo MD Insurance:   Payor: [facility] / Plan: [facility] / Product Type: *No Product type* /     [x] Patient's date of birth verified                Visit #   Current  / Total 24 28   Time   In / Out 0778 0820   Total Treatment Time (in minutes) 55    Total Timed Codes  (in minutes) 55    [facility] Totals Reminder:    8-22 min = 1 unit; 23-37 min = 2 units; 38-52 min = 3 units;  53-67 min = 4 units; 68-82 min = 5 units      SUBJECTIVE  Pain Level (0-10 scale): 1/10    Any medication changes, allergies to medications, adverse drug reactions, diagnosis change, or new procedure performed?: No  Medications: [x]  Verified on Patient Summary List    Subjective functional status/changes:     \"I am a little sore at the front of my shoulder for some reason.\"    OBJECTIVE  Therapeutic Procedures:  Tx Min Billable or 1:1 Min (if diff from Tx Min) Procedure, Rationale, Specifics   55 55 [id number] Therapeutic Exercise (timed):  increase ROM, strength, coordination, balance, and proprioception to improve patient's ability to progress to PLOF and address remaining functional goals. (see flow sheet as applicable)   55 55    Total Total   [x] Patient Education billed concurrently with other procedures    Pain Level at end of session (0-10 scale): 1/10    Assessment   Patient tolerated treatment well today. We are noting some weakness still in his scapular strength at this time, so we did focus on this area today. Patient will continue to benefit from skilled PT services to modify and progress therapeutic interventions, analyze and address functional mobility

## 2025-05-19 ENCOUNTER — HOSPITAL ENCOUNTER (OUTPATIENT)
Facility: HOSPITAL | Age: 60
Setting detail: RECURRING SERIES
Discharge: HOME | End: 2025-05-22
Payer: COMMERCIAL

## 2025-05-19 PROCEDURE — 97110 THERAPEUTIC EXERCISES: CPT

## 2025-05-19 NOTE — PROGRESS NOTES
PHYSICAL THERAPY - DAILY TREATMENT NOTE (updated 3/23)    Date of Service: 2025        Patient Name:  Robbie Bustamante :  1965   Medical   Diagnosis:  Complete rotator cuff tear or rupture of right shoulder, not specified as traumatic [M75.121]  Impingement syndrome of right shoulder [M75.41] Treatment Diagnosis:  M25.511  RIGHT SHOULDER PAIN    Referral Source:  Heladio Suazo MD Insurance:   Payor: [facility] / Plan: [facility] / Product Type: *No Product type* /     [x] Patient's date of birth verified                Visit #   Current  / Total 25 28   Time   In / Out 710 am 805 am   Total Treatment Time (in minutes) 55    Total Timed Codes  (in minutes) 55    [facility] Totals Reminder:    8-22 min = 1 unit; 23-37 min = 2 units; 38-52 min = 3 units;  53-67 min = 4 units; 68-82 min = 5 units      SUBJECTIVE  Pain Level (0-10 scale): 1/10    Any medication changes, allergies to medications, adverse drug reactions, diagnosis change, or new procedure performed?: No  Medications: [x]  Verified on Patient Summary List    Subjective functional status/changes:     \"Pt reports that he is having increased stiffness and tightness in the shoulder this morning .\"    OBJECTIVE  Therapeutic Procedures:  Tx Min Billable or 1:1 Min (if diff from Tx Min) Procedure, Rationale, Specifics   55 55 [id number] Therapeutic Exercise (timed):  increase ROM, strength, coordination, balance, and proprioception to improve patient's ability to progress to PLOF and address remaining functional goals. (see flow sheet as applicable)        55 55    Total Total   [x] Patient Education billed concurrently with other procedures      Pain Level at end of session (0-10 scale): 0/10    Assessment   Patient tolerated treatment working on functional shoulder motion and strength. Pt had increase stiffness and tightness upon arrival. Pt also had increased weakness and needed to reduce resistance with some of his activities. Pt pushed all ex he was able

## 2025-05-21 ENCOUNTER — HOSPITAL ENCOUNTER (OUTPATIENT)
Facility: HOSPITAL | Age: 60
Setting detail: RECURRING SERIES
Discharge: HOME | End: 2025-05-24
Payer: COMMERCIAL

## 2025-05-21 PROCEDURE — 97110 THERAPEUTIC EXERCISES: CPT

## 2025-05-21 NOTE — PROGRESS NOTES
PHYSICAL THERAPY - DAILY TREATMENT NOTE (updated 3/23)    Date of Service: 2025        Patient Name:  Robbie Bustamante :  1965   Medical   Diagnosis:  Complete rotator cuff tear or rupture of right shoulder, not specified as traumatic [M75.121]  Impingement syndrome of right shoulder [M75.41] Treatment Diagnosis:  M25.511  RIGHT SHOULDER PAIN    Referral Source:  Heladio Suazo MD Insurance:   Payor: [facility] / Plan: [facility] / Product Type: *No Product type* /     [x] Patient's date of birth verified                Visit #   Current  / Total 26 28   Time   In / Out 724 am 836 am   Total Treatment Time (in minutes) 72   Total Timed Codes  (in minutes) 72   [facility] Totals Reminder:    8-22 min = 1 unit; 23-37 min = 2 units; 38-52 min = 3 units;  53-67 min = 4 units; 68-82 min = 5 units      SUBJECTIVE  Pain Level (0-10 scale): 0/10    Any medication changes, allergies to medications, adverse drug reactions, diagnosis change, or new procedure performed?: No  Medications: [x]  Verified on Patient Summary List    Subjective functional status/changes:     \"Pt reports dong better still having trouble getting arm away from body.\"    OBJECTIVE  Therapeutic Procedures:  Tx Min Billable or 1:1 Min (if diff from Tx Min) Procedure, Rationale, Specifics   72 72 [id number] Therapeutic Exercise (timed):  increase ROM, strength, coordination, balance, and proprioception to improve patient's ability to progress to PLOF and address remaining functional goals. (see flow sheet as applicable)        72 72    Total Total   [x] Patient Education billed concurrently with other procedures      Pain Level at end of session (0-10 scale): 0/10    Assessment   Patient tolerated treatment working on functional shoulder strength and motion, Pt doing well with all the ex noting increased attention to ex for reach outside base of support. Pt also given updated HEP and thera band for resistance activities. Pt still doing CP at home. .

## 2025-05-28 ENCOUNTER — HOSPITAL ENCOUNTER (OUTPATIENT)
Facility: HOSPITAL | Age: 60
Setting detail: RECURRING SERIES
Discharge: HOME | End: 2025-05-31
Payer: COMMERCIAL

## 2025-05-28 PROCEDURE — 97110 THERAPEUTIC EXERCISES: CPT

## 2025-05-28 NOTE — PROGRESS NOTES
PHYSICAL THERAPY - DAILY TREATMENT NOTE (updated 3/23)    Date of Service: 2025        Patient Name:  Robbie Bustamante :  1965   Medical   Diagnosis:  Complete rotator cuff tear or rupture of right shoulder, not specified as traumatic [M75.121]  Impingement syndrome of right shoulder [M75.41] Treatment Diagnosis:  M25.511  RIGHT SHOULDER PAIN    Referral Source:  eHladio Suazo MD Insurance:   Payor: [facility] / Plan: [facility] / Product Type: *No Product type* /     [x] Patient's date of birth verified                Visit #   Current  / Total 27 28   Time   In / Out 0658 0756   Total Treatment Time (in minutes) 58   Total Timed Codes  (in minutes) 58   [facility] Totals Reminder:    8-22 min = 1 unit; 23-37 min = 2 units; 38-52 min = 3 units;  53-67 min = 4 units; 68-82 min = 5 units      SUBJECTIVE  Pain Level (0-10 scale): 0/10    Any medication changes, allergies to medications, adverse drug reactions, diagnosis change, or new procedure performed?: No  Medications: [x]  Verified on Patient Summary List    Subjective functional status/changes:     \"I am doing good. I just still wake with some pain when I sleep on it.\"    OBJECTIVE  Therapeutic Procedures:  Tx Min Billable or 1:1 Min (if diff from Tx Min) Procedure, Rationale, Specifics   58 58 [id number] Therapeutic Exercise (timed):  increase ROM, strength, coordination, balance, and proprioception to improve patient's ability to progress to PLOF and address remaining functional goals. (see flow sheet as applicable)        58 58    Total Total   [x] Patient Education billed concurrently with other procedures      Pain Level at end of session (0-10 scale): 0/10    Assessment   Patient tolerated treatment well today with focus on maintaining form and elbow extension during all UE movements. Reviewed updated HEP given at last visit to ensure he is performing all TB exercises correctly. Improvements noted with ROM on shoulder abduction and flexion movements.

## 2025-06-02 ENCOUNTER — HOSPITAL ENCOUNTER (OUTPATIENT)
Facility: HOSPITAL | Age: 60
Setting detail: RECURRING SERIES
Discharge: HOME | End: 2025-06-05
Payer: COMMERCIAL

## 2025-06-02 PROCEDURE — 97110 THERAPEUTIC EXERCISES: CPT

## 2025-06-02 NOTE — PROGRESS NOTES
[facility]  [address] [place] [id number]  Ph: [phone]     Fax: [phone]    PHYSICAL THERAPY PROGRESS NOTE  Patient Name:  Robbie Bustamante :  1965   Treatment/Medical Diagnosis: Complete rotator cuff tear or rupture of right shoulder, not specified as traumatic [M75.121]  Impingement syndrome of right shoulder [M75.41]   Referral Source:  Heladio Suazo MD     Date of Initial Visit:  2025 Attended Visits:  28 Missed Visits:  0       Summary of Care / Assessment / Recommendations:   Patient tolerated treatment well today and has been progressing well within protocol standards. He is not having much pain or irritation in the shoulder except when he sleeps on it some nights. He has fair ROM at this time with some weakness noted at end ranges of motion. His IR is most limited, but it is progressing with home and stretches in the clinic. He still reverts to elbow flexion when strengthening overhead movements, so need frequent cuing.we reviewed HEP to work on strengthening into abduction, scaption, and flexion ROM to allow him to be more functional with overhead tasks around the home. We will reduce to x1/week per pt request to further improve strength and end range mobility in the shoulder.  Patient will continue to benefit from skilled PT services to modify and progress therapeutic interventions, analyze and address functional mobility deficits, analyze and address ROM deficits, analyze and address strength deficits, and analyze and address soft tissue restrictions to address functional deficits and attain remaining goals.      Progress Toward Goals:   Short Term Goals, to be met within 5 treatments:  Patient will demonstrate independence and compliance with HEP in order to increase mobility and assist with carryover from PT services.  Status at last Eval/Progress Note: completing without irritation or problems  Current Status: completing updated

## 2025-06-02 NOTE — PROGRESS NOTES
PHYSICAL THERAPY - DAILY TREATMENT NOTE (updated 3/23)    Date of Service: 2025        Patient Name:  Robbie Bustamante :  1965   Medical   Diagnosis:  Complete rotator cuff tear or rupture of right shoulder, not specified as traumatic [M75.121]  Impingement syndrome of right shoulder [M75.41] Treatment Diagnosis:  M25.511  RIGHT SHOULDER PAIN    Referral Source:  Heladio Suazo MD Insurance:   Payor: [facility] / Plan: [facility] / Product Type: *No Product type* /     [x] Patient's date of birth verified                Visit #   Current  / Total 28 28   Time   In / Out 0730 0830   Total Treatment Time (in minutes) 60   Total Timed Codes  (in minutes) 60   [facility] Totals Reminder:    8-22 min = 1 unit; 23-37 min = 2 units; 38-52 min = 3 units;  53-67 min = 4 units; 68-82 min = 5 units      SUBJECTIVE  Pain Level (0-10 scale): 0/10    Any medication changes, allergies to medications, adverse drug reactions, diagnosis change, or new procedure performed?: No  Medications: [x]  Verified on Patient Summary List    Subjective functional status/changes:     \"I am doing good today and do not have any pain.\"    OBJECTIVE  Therapeutic Procedures:  Tx Min Billable or 1:1 Min (if diff from Tx Min) Procedure, Rationale, Specifics   60 60 [id number] Therapeutic Exercise (timed):  increase ROM, strength, coordination, balance, and proprioception to improve patient's ability to progress to PLOF and address remaining functional goals. (see flow sheet as applicable)        60 60    Total Total   [x] Patient Education billed concurrently with other procedures    Shoulder:   Strength AROM       Right Left Right Left     Flexion 4-/5 5/5  160 WFL     Extension 5/5 5/5  65 WFL     Abduction 4-/5 5/5  150 WFL     IR 5/5 5/5  60 or L2 WFL     ER 4/5 5/5  92 WFL   Elbow:   Strength AROM       Right Left Right Left     Flexion 5/5 5/5 WFL WFL     Extension 5/5 5/5 WFL WFL         Pain Level at end of session (0-10 scale):

## 2025-06-12 ENCOUNTER — TELEPHONE (OUTPATIENT)
Facility: HOSPITAL | Age: 60
End: 2025-06-12

## (undated) DEVICE — SYRINGE MED 10ML LUERLOCK TIP W/O SFTY DISP

## (undated) DEVICE — SUTURE MONOCRYL + SZ 4-0 L27IN ABSRB UD L19MM PS-2 3/8 CIR MCP426H

## (undated) DEVICE — Device

## (undated) DEVICE — PAD,PREPPING,CUFFED,24X48,7",NONSTERILE: Brand: MEDLINE

## (undated) DEVICE — SMARTGOWN SURGICAL GOWN, LARGE: Brand: CONVERTORS

## (undated) DEVICE — HYPODERMIC SAFETY NEEDLE: Brand: MONOJECT

## (undated) DEVICE — SUTURE VICRYL + SZ 0 L27IN ABSRB WHT CT-2 1/2 CIR TAPERPOINT VCP270H

## (undated) DEVICE — SPONGE GZ W4XL4IN COT 12 PLY TYP VII WVN C FLD DSGN

## (undated) DEVICE — SUTURE VICRYL + SZ 1 L27IN ANTIBACTERIAL POLYGLACTIN 910 W VCP281H

## (undated) DEVICE — STERILE POLYISOPRENE POWDER-FREE SURGICAL GLOVES WITH EMOLLIENT COATING: Brand: PROTEXIS

## (undated) DEVICE — MAGNETIC INSTR DRAPE 20X16: Brand: MEDLINE INDUSTRIES, INC.

## (undated) DEVICE — DRAPE TOWEL: Brand: CONVERTORS

## (undated) DEVICE — TUBING, SUCTION, 3/16" X 10', SCALLOP: Brand: MEDLINE

## (undated) DEVICE — ELECTRODE PT RET AD L9FT HI MOIST COND ADH HYDRGEL CORDED

## (undated) DEVICE — BLADE ES ELASTOMERIC COAT INSUL DURABLE BEND UPTO 90DEG

## (undated) DEVICE — COAGULATOR ELECSURG BLADE 10 FTX1 IN PTFE STRL ULTRACLEAN

## (undated) DEVICE — SOLUTION IRRIG 1000ML 0.9% SOD CHL USP POUR PLAS BTL

## (undated) DEVICE — GLOVE ORANGE PI 7 1/2   MSG9075

## (undated) DEVICE — TOWEL,OR,DSP,ST,BLUE,STD,4/PK,20PK/CS: Brand: MEDLINE

## (undated) DEVICE — 3M™ SURGICAL CLIPPER PROFESSIONAL BLADE 9680: Brand: 3M™

## (undated) DEVICE — 1200CC GUARDIAN II: Brand: GUARDIAN

## (undated) DEVICE — SPONGE GZ 4X4 IN 16-PLY DETECTABLE W/ DMT MSTR TAG

## (undated) DEVICE — APPLICATOR MEDICATED 26 CC SOLUTION HI LT ORNG CHLORAPREP

## (undated) DEVICE — HYPODERMIC SAFETY NEEDLE: Brand: MAGELLAN

## (undated) DEVICE — SMARTGOWN SURGICAL GOWN, XL, LONG: Brand: CONVERTORS

## (undated) DEVICE — SPONGE LAP SOFT 18X18 IN X RAY DETECTABLE

## (undated) DEVICE — GLOVE SURG SZ 6 THK91MIL LTX FREE SYN POLYISOPRENE ANTI

## (undated) DEVICE — PACK,SET-UP: Brand: MEDLINE

## (undated) DEVICE — DEVON TUBE HOLDER FIXED TOUCH FASTEN STRAP: Brand: DEVON

## (undated) DEVICE — SKIN MARKER,REGULAR TIP WITH RULER AND LABELS: Brand: DEVON

## (undated) DEVICE — KIT BREATHING CIRCUIT L72IN THREE LUMEN BAG SAMPLING LINE L1

## (undated) DEVICE — WASH CLOTH INCONT LO LINT PREM 12X13 IN LF DISP

## (undated) DEVICE — COUNTER NDL 40 COUNT HLD 70 FOAM BLK ADH W/ MAG

## (undated) DEVICE — SPONGE GZ W4XL4IN COT 12 PLY TYP VII WVN C FLD DSGN STERILE

## (undated) DEVICE — LIQUIBAND RAPID ADHESIVE 36/CS 0.8ML: Brand: MEDLINE

## (undated) DEVICE — INTENDED FOR TISSUE SEPARATION, AND OTHER PROCEDURES THAT REQUIRE A SHARP SURGICAL BLADE TO PUNCTURE OR CUT.: Brand: BARD-PARKER ® CARBON RIB-BACK BLADES

## (undated) DEVICE — BLADE,STAINLESS-STEEL,10,STRL,DISPOSABLE: Brand: MEDLINE

## (undated) DEVICE — YANKAUER,BULB TIP,W/O VENT,RIGID,STERILE: Brand: MEDLINE

## (undated) DEVICE — SYRINGE IRRIG 60ML SFT PLIABLE BLB EZ TO GRP 1 HND USE W/

## (undated) DEVICE — TUBE ET DIA7MM ORAL NSL CUF MURPHY EYE HI LO RADPQ LN DISP

## (undated) DEVICE — JELLY,LUBE,STERILE,FLIP TOP,TUBE,4-OZ: Brand: MEDLINE

## (undated) DEVICE — SOLUTION IRRIG 1000ML STRL H2O USP PLAS POUR BTL

## (undated) DEVICE — TUBING, SUCTION, 9/32" X 10', STRAIGHT: Brand: MEDLINE

## (undated) DEVICE — DRAPE,MINOR PROC,6X6 FEN, STER: Brand: MEDLINE

## (undated) DEVICE — SMARTSLEEVE SURGICAL GOWN, 3XL LONG: Brand: CONVERTORS

## (undated) DEVICE — SUTURE VICRYL SZ 1 L27IN ABSRB UD CT-1 L36MM 1/2 CIR J261H